# Patient Record
Sex: FEMALE | Race: ASIAN | NOT HISPANIC OR LATINO
[De-identification: names, ages, dates, MRNs, and addresses within clinical notes are randomized per-mention and may not be internally consistent; named-entity substitution may affect disease eponyms.]

---

## 2021-01-14 ENCOUNTER — APPOINTMENT (OUTPATIENT)
Dept: OBGYN | Facility: CLINIC | Age: 36
End: 2021-01-14
Payer: COMMERCIAL

## 2021-01-14 PROCEDURE — 99202 OFFICE O/P NEW SF 15 MIN: CPT

## 2021-01-14 PROCEDURE — 76801 OB US < 14 WKS SINGLE FETUS: CPT

## 2021-01-14 PROCEDURE — 76813 OB US NUCHAL MEAS 1 GEST: CPT

## 2021-01-27 PROBLEM — Z00.00 ENCOUNTER FOR PREVENTIVE HEALTH EXAMINATION: Status: ACTIVE | Noted: 2021-01-27

## 2021-02-19 ENCOUNTER — INPATIENT (INPATIENT)
Facility: HOSPITAL | Age: 36
LOS: 2 days | Discharge: HOME | End: 2021-02-22
Attending: HOSPITALIST | Admitting: HOSPITALIST
Payer: COMMERCIAL

## 2021-02-19 VITALS
SYSTOLIC BLOOD PRESSURE: 106 MMHG | DIASTOLIC BLOOD PRESSURE: 57 MMHG | HEART RATE: 56 BPM | RESPIRATION RATE: 18 BRPM | OXYGEN SATURATION: 97 % | TEMPERATURE: 98 F

## 2021-02-19 DIAGNOSIS — U07.1 COVID-19: ICD-10-CM

## 2021-02-19 DIAGNOSIS — Z34.90 ENCOUNTER FOR SUPERVISION OF NORMAL PREGNANCY, UNSPECIFIED, UNSPECIFIED TRIMESTER: ICD-10-CM

## 2021-02-19 DIAGNOSIS — E03.9 HYPOTHYROIDISM, UNSPECIFIED: ICD-10-CM

## 2021-02-19 DIAGNOSIS — Z29.9 ENCOUNTER FOR PROPHYLACTIC MEASURES, UNSPECIFIED: ICD-10-CM

## 2021-02-19 DIAGNOSIS — R74.01 ELEVATION OF LEVELS OF LIVER TRANSAMINASE LEVELS: ICD-10-CM

## 2021-02-19 DIAGNOSIS — E87.1 HYPO-OSMOLALITY AND HYPONATREMIA: ICD-10-CM

## 2021-02-19 DIAGNOSIS — M54.9 DORSALGIA, UNSPECIFIED: ICD-10-CM

## 2021-02-19 LAB
ALBUMIN SERPL ELPH-MCNC: 3.3 G/DL — SIGNIFICANT CHANGE UP (ref 3.3–5)
ALP SERPL-CCNC: 67 U/L — SIGNIFICANT CHANGE UP (ref 40–120)
ALT FLD-CCNC: 45 U/L — HIGH (ref 4–33)
ANION GAP SERPL CALC-SCNC: 11 MMOL/L — SIGNIFICANT CHANGE UP (ref 7–14)
ANION GAP SERPL CALC-SCNC: 11 MMOL/L — SIGNIFICANT CHANGE UP (ref 7–14)
ANISOCYTOSIS BLD QL: SLIGHT — SIGNIFICANT CHANGE UP
APPEARANCE UR: CLEAR — SIGNIFICANT CHANGE UP
AST SERPL-CCNC: 83 U/L — HIGH (ref 4–32)
BASOPHILS # BLD AUTO: 0 K/UL — SIGNIFICANT CHANGE UP (ref 0–0.2)
BASOPHILS NFR BLD AUTO: 0 % — SIGNIFICANT CHANGE UP (ref 0–2)
BILIRUB SERPL-MCNC: 0.3 MG/DL — SIGNIFICANT CHANGE UP (ref 0.2–1.2)
BILIRUB UR-MCNC: NEGATIVE — SIGNIFICANT CHANGE UP
BUN SERPL-MCNC: 6 MG/DL — LOW (ref 7–23)
BUN SERPL-MCNC: 8 MG/DL — SIGNIFICANT CHANGE UP (ref 7–23)
CALCIUM SERPL-MCNC: 8.1 MG/DL — LOW (ref 8.4–10.5)
CALCIUM SERPL-MCNC: 8.6 MG/DL — SIGNIFICANT CHANGE UP (ref 8.4–10.5)
CHLORIDE SERPL-SCNC: 100 MMOL/L — SIGNIFICANT CHANGE UP (ref 98–107)
CHLORIDE SERPL-SCNC: 100 MMOL/L — SIGNIFICANT CHANGE UP (ref 98–107)
CO2 SERPL-SCNC: 20 MMOL/L — LOW (ref 22–31)
CO2 SERPL-SCNC: 22 MMOL/L — SIGNIFICANT CHANGE UP (ref 22–31)
COLOR SPEC: YELLOW — SIGNIFICANT CHANGE UP
CREAT SERPL-MCNC: 0.55 MG/DL — SIGNIFICANT CHANGE UP (ref 0.5–1.3)
CREAT SERPL-MCNC: 0.69 MG/DL — SIGNIFICANT CHANGE UP (ref 0.5–1.3)
DIFF PNL FLD: NEGATIVE — SIGNIFICANT CHANGE UP
EOSINOPHIL # BLD AUTO: 0 K/UL — SIGNIFICANT CHANGE UP (ref 0–0.5)
EOSINOPHIL NFR BLD AUTO: 0 % — SIGNIFICANT CHANGE UP (ref 0–6)
GLUCOSE SERPL-MCNC: 79 MG/DL — SIGNIFICANT CHANGE UP (ref 70–99)
GLUCOSE SERPL-MCNC: 80 MG/DL — SIGNIFICANT CHANGE UP (ref 70–99)
GLUCOSE UR QL: NEGATIVE — SIGNIFICANT CHANGE UP
HCT VFR BLD CALC: 36.9 % — SIGNIFICANT CHANGE UP (ref 34.5–45)
HCT VFR BLD CALC: 41.2 % — SIGNIFICANT CHANGE UP (ref 34.5–45)
HGB BLD-MCNC: 12.2 G/DL — SIGNIFICANT CHANGE UP (ref 11.5–15.5)
HGB BLD-MCNC: 13.1 G/DL — SIGNIFICANT CHANGE UP (ref 11.5–15.5)
IANC: 5.82 K/UL — SIGNIFICANT CHANGE UP (ref 1.5–8.5)
KETONES UR-MCNC: ABNORMAL
LEUKOCYTE ESTERASE UR-ACNC: NEGATIVE — SIGNIFICANT CHANGE UP
LYMPHOCYTES # BLD AUTO: 0.5 K/UL — LOW (ref 1–3.3)
LYMPHOCYTES # BLD AUTO: 6.3 % — LOW (ref 13–44)
MCHC RBC-ENTMCNC: 27.2 PG — SIGNIFICANT CHANGE UP (ref 27–34)
MCHC RBC-ENTMCNC: 27.4 PG — SIGNIFICANT CHANGE UP (ref 27–34)
MCHC RBC-ENTMCNC: 31.8 GM/DL — LOW (ref 32–36)
MCHC RBC-ENTMCNC: 33.1 GM/DL — SIGNIFICANT CHANGE UP (ref 32–36)
MCV RBC AUTO: 82.7 FL — SIGNIFICANT CHANGE UP (ref 80–100)
MCV RBC AUTO: 85.5 FL — SIGNIFICANT CHANGE UP (ref 80–100)
METAMYELOCYTES # FLD: 2.7 % — HIGH (ref 0–1)
MICROCYTES BLD QL: SLIGHT — SIGNIFICANT CHANGE UP
MONOCYTES # BLD AUTO: 0.71 K/UL — SIGNIFICANT CHANGE UP (ref 0–0.9)
MONOCYTES NFR BLD AUTO: 9 % — SIGNIFICANT CHANGE UP (ref 2–14)
NEUTROPHILS # BLD AUTO: 6.03 K/UL — SIGNIFICANT CHANGE UP (ref 1.8–7.4)
NEUTROPHILS NFR BLD AUTO: 55.9 % — SIGNIFICANT CHANGE UP (ref 43–77)
NEUTS BAND # BLD: 20.7 % — CRITICAL HIGH (ref 0–6)
NITRITE UR-MCNC: NEGATIVE — SIGNIFICANT CHANGE UP
NRBC # BLD: 0 /100 WBCS — SIGNIFICANT CHANGE UP
NRBC # FLD: 0 K/UL — SIGNIFICANT CHANGE UP
OVALOCYTES BLD QL SMEAR: SLIGHT — SIGNIFICANT CHANGE UP
PH UR: 6.5 — SIGNIFICANT CHANGE UP (ref 5–8)
PLAT MORPH BLD: ABNORMAL
PLATELET # BLD AUTO: 175 K/UL — SIGNIFICANT CHANGE UP (ref 150–400)
PLATELET # BLD AUTO: 192 K/UL — SIGNIFICANT CHANGE UP (ref 150–400)
PLATELET COUNT - ESTIMATE: NORMAL — SIGNIFICANT CHANGE UP
POIKILOCYTOSIS BLD QL AUTO: SLIGHT — SIGNIFICANT CHANGE UP
POTASSIUM SERPL-MCNC: 3.9 MMOL/L — SIGNIFICANT CHANGE UP (ref 3.5–5.3)
POTASSIUM SERPL-MCNC: 4.3 MMOL/L — SIGNIFICANT CHANGE UP (ref 3.5–5.3)
POTASSIUM SERPL-SCNC: 3.9 MMOL/L — SIGNIFICANT CHANGE UP (ref 3.5–5.3)
POTASSIUM SERPL-SCNC: 4.3 MMOL/L — SIGNIFICANT CHANGE UP (ref 3.5–5.3)
PROT SERPL-MCNC: 6.8 G/DL — SIGNIFICANT CHANGE UP (ref 6–8.3)
PROT UR-MCNC: ABNORMAL
RBC # BLD: 4.46 M/UL — SIGNIFICANT CHANGE UP (ref 3.8–5.2)
RBC # BLD: 4.82 M/UL — SIGNIFICANT CHANGE UP (ref 3.8–5.2)
RBC # FLD: 13.7 % — SIGNIFICANT CHANGE UP (ref 10.3–14.5)
RBC # FLD: 13.7 % — SIGNIFICANT CHANGE UP (ref 10.3–14.5)
RBC BLD AUTO: ABNORMAL
SARS-COV-2 RNA SPEC QL NAA+PROBE: DETECTED
SMUDGE CELLS # BLD: PRESENT — SIGNIFICANT CHANGE UP
SODIUM SERPL-SCNC: 131 MMOL/L — LOW (ref 135–145)
SODIUM SERPL-SCNC: 133 MMOL/L — LOW (ref 135–145)
SP GR SPEC: 1.01 — SIGNIFICANT CHANGE UP (ref 1.01–1.02)
UROBILINOGEN FLD QL: SIGNIFICANT CHANGE UP
VARIANT LYMPHS # BLD: 5.4 % — SIGNIFICANT CHANGE UP (ref 0–6)
WBC # BLD: 7.87 K/UL — SIGNIFICANT CHANGE UP (ref 3.8–10.5)
WBC # BLD: 8.03 K/UL — SIGNIFICANT CHANGE UP (ref 3.8–10.5)
WBC # FLD AUTO: 7.87 K/UL — SIGNIFICANT CHANGE UP (ref 3.8–10.5)
WBC # FLD AUTO: 8.03 K/UL — SIGNIFICANT CHANGE UP (ref 3.8–10.5)

## 2021-02-19 PROCEDURE — 99223 1ST HOSP IP/OBS HIGH 75: CPT | Mod: AI

## 2021-02-19 PROCEDURE — 99285 EMERGENCY DEPT VISIT HI MDM: CPT

## 2021-02-19 PROCEDURE — 93970 EXTREMITY STUDY: CPT | Mod: 26

## 2021-02-19 RX ORDER — LIDOCAINE 4 G/100G
1 CREAM TOPICAL ONCE
Refills: 0 | Status: COMPLETED | OUTPATIENT
Start: 2021-02-19 | End: 2021-02-19

## 2021-02-19 RX ORDER — ENOXAPARIN SODIUM 100 MG/ML
40 INJECTION SUBCUTANEOUS EVERY 12 HOURS
Refills: 0 | Status: DISCONTINUED | OUTPATIENT
Start: 2021-02-19 | End: 2021-02-22

## 2021-02-19 RX ORDER — ACETAMINOPHEN 500 MG
650 TABLET ORAL EVERY 8 HOURS
Refills: 0 | Status: DISCONTINUED | OUTPATIENT
Start: 2021-02-19 | End: 2021-02-22

## 2021-02-19 RX ORDER — LEVOTHYROXINE SODIUM 125 MCG
125 TABLET ORAL DAILY
Refills: 0 | Status: DISCONTINUED | OUTPATIENT
Start: 2021-02-19 | End: 2021-02-22

## 2021-02-19 RX ORDER — SODIUM CHLORIDE 9 MG/ML
1000 INJECTION, SOLUTION INTRAVENOUS ONCE
Refills: 0 | Status: COMPLETED | OUTPATIENT
Start: 2021-02-19 | End: 2021-02-19

## 2021-02-19 RX ORDER — ONDANSETRON 8 MG/1
4 TABLET, FILM COATED ORAL EVERY 8 HOURS
Refills: 0 | Status: DISCONTINUED | OUTPATIENT
Start: 2021-02-19 | End: 2021-02-22

## 2021-02-19 RX ORDER — ENOXAPARIN SODIUM 100 MG/ML
40 INJECTION SUBCUTANEOUS DAILY
Refills: 0 | Status: DISCONTINUED | OUTPATIENT
Start: 2021-02-19 | End: 2021-02-19

## 2021-02-19 RX ORDER — VANCOMYCIN HCL 1 G
1000 VIAL (EA) INTRAVENOUS EVERY 12 HOURS
Refills: 0 | Status: DISCONTINUED | OUTPATIENT
Start: 2021-02-19 | End: 2021-02-20

## 2021-02-19 RX ORDER — ACETAMINOPHEN 500 MG
650 TABLET ORAL ONCE
Refills: 0 | Status: COMPLETED | OUTPATIENT
Start: 2021-02-19 | End: 2021-02-19

## 2021-02-19 RX ORDER — SODIUM CHLORIDE 9 MG/ML
1000 INJECTION, SOLUTION INTRAVENOUS
Refills: 0 | Status: DISCONTINUED | OUTPATIENT
Start: 2021-02-19 | End: 2021-02-22

## 2021-02-19 RX ORDER — ASPIRIN/CALCIUM CARB/MAGNESIUM 324 MG
81 TABLET ORAL DAILY
Refills: 0 | Status: DISCONTINUED | OUTPATIENT
Start: 2021-02-19 | End: 2021-02-22

## 2021-02-19 RX ORDER — ONDANSETRON 8 MG/1
4 TABLET, FILM COATED ORAL ONCE
Refills: 0 | Status: COMPLETED | OUTPATIENT
Start: 2021-02-19 | End: 2021-02-19

## 2021-02-19 RX ADMIN — LIDOCAINE 1 PATCH: 4 CREAM TOPICAL at 12:26

## 2021-02-19 RX ADMIN — Medication 650 MILLIGRAM(S): at 22:00

## 2021-02-19 RX ADMIN — LIDOCAINE 1 PATCH: 4 CREAM TOPICAL at 21:38

## 2021-02-19 RX ADMIN — SODIUM CHLORIDE 1000 MILLILITER(S): 9 INJECTION, SOLUTION INTRAVENOUS at 18:47

## 2021-02-19 RX ADMIN — Medication 250 MILLIGRAM(S): at 23:00

## 2021-02-19 RX ADMIN — SODIUM CHLORIDE 100 MILLILITER(S): 9 INJECTION, SOLUTION INTRAVENOUS at 18:47

## 2021-02-19 RX ADMIN — ONDANSETRON 4 MILLIGRAM(S): 8 TABLET, FILM COATED ORAL at 12:27

## 2021-02-19 RX ADMIN — SODIUM CHLORIDE 1000 MILLILITER(S): 9 INJECTION, SOLUTION INTRAVENOUS at 14:24

## 2021-02-19 RX ADMIN — Medication 650 MILLIGRAM(S): at 12:26

## 2021-02-19 RX ADMIN — Medication 81 MILLIGRAM(S): at 22:11

## 2021-02-19 RX ADMIN — SODIUM CHLORIDE 1000 MILLILITER(S): 9 INJECTION, SOLUTION INTRAVENOUS at 12:26

## 2021-02-19 RX ADMIN — Medication 1 TABLET(S): at 22:11

## 2021-02-19 NOTE — H&P ADULT - PROBLEM SELECTOR PLAN 6
Pt reports she takes LDN Naltrexone 4.5mg daily as per her OBG recs.  Will obtain OBG consult to clarify medication Pt reports she takes LDN Naltrexone 4.5mg daily as per her OBG recs which she has not taken for past 2-3 days due to vomiting   Called OBG  service  to clarify medication- await call back consult

## 2021-02-19 NOTE — ED ADULT NURSE NOTE - OBJECTIVE STATEMENT
Pt received to  6 c/o covid symptoms x 1 week. AxOx4 and ambulatory at baseline. Pt tested covid+ on Sunday. Pt endorses fever, cough, decreased PO intake, R lower back pain and b/l calf pain. Pt intermittently coughing at this time, in NAD, respirations even/unlabored, denies SOB, O2 sat 98% on RA. No redness/swelling noted to calves. Pt endorses vomiting x 2 and diarrhea x4 today, denies blood in stool or emesis. Pt denies headache, dizziness and chest pain at this time. Pt is ~16 weeks pregnant at this time with 1st pregnancy, had US in January. PMH of hyperthyroidism. 20G IV placed to L AC, labs drawn and sent, covid swabbed, medicated as per orders, will continue to monitor.

## 2021-02-19 NOTE — ED PROVIDER NOTE - PHYSICAL EXAMINATION
GEN: NAD, awake, well appearing  HEENT: NCAT, MMM, normal conjunctiva, perrl  CHEST/LUNGS: Non-tachypneic, CTAB, bilateral breath sounds  CARDIAC: Non-tachycardic, s1s2, normal perfusion, no peripheral edema  ABDOMEN: Soft, gravid abdomen, NTND, No rebound/guarding  MSK: No joint tenderness, no gross deformity of extremities  SKIN: No rashes, no petechiae, no vesicles  NEURO: CN grossly intact, normal coordination, no focal motor or sensory deficits  PSYCH: Alert, appropriate, cooperative

## 2021-02-19 NOTE — CONSULT NOTE ADULT - SUBJECTIVE AND OBJECTIVE BOX
FRANCY HOWARD  35y  Female [id number]    HPI:  35F 16 weeks pregnancy  was diagnosed with  COVID  on  presents to the ED with fever chills. Symptoms started on   Fever 100-101.2, relief with Tylenol extra strength. Associated chills. Decreased Fever since yday  Nausea/Vomiting Diarrhea since 2-3 days, 3-4 loose stools /days. No diarrhea since this AM  Was not able to tolerate much PO since 2-3 days. Was able to eat some crackers this AM   c/o Calf discomfort for past few days as well, was instructed by her obgyn to take baby ASA 81mg daily.   C/o Low back pain with stiffness- started last night, was given Lidocaine patch in the ED with some relief  No urinary or bowel complaints. No sensory or motor deficits  pts  and father have COVID- Father is admitted to the hospital for hypoxemia  Denies CP, SOB, dysuria, headache, numbness  In the ED, vitals were 97.7, 96,  111/59, 16 and 98%- 100%  on RA. Pt was given 2 L IVF.    Reports some improvement in overall condition    (2021 17:45)        Name of GYN Physician:     POB:      Pgyn: Denies fibroids, cysts, endometriosis, STI's, Abnormal pap smears     Home meds:     Hospital Meds:   MEDICATIONS  (STANDING):  enoxaparin Injectable 40 milliGRAM(s) SubCutaneous daily  lactated ringers. 1000 milliLiter(s) (100 mL/Hr) IV Continuous <Continuous>  levothyroxine 125 MICROGram(s) Oral daily  prenatal multivitamin 1 Tablet(s) Oral daily  vancomycin  IVPB 1000 milliGRAM(s) IV Intermittent every 12 hours    MEDICATIONS  (PRN):  acetaminophen   Tablet .. 650 milliGRAM(s) Oral every 8 hours PRN Temp greater or equal to 38C (100.4F), Mild Pain (1 - 3), Moderate Pain (4 - 6), Severe Pain (7 - 10)  ondansetron Injectable 4 milliGRAM(s) IV Push every 8 hours PRN Nausea and/or Vomiting      Allergies    No Known Allergies    Intolerances        PAST MEDICAL & SURGICAL HISTORY:  Hypothyroidism        FAMILY HISTORY:      Social History:  Denies smoking use, drug use, alcohol use.   +occasional social alcohol use    Vital Signs Last 24 Hrs  T(C): 36.9 (2021 16:50), Max: 36.9 (2021 16:50)  T(F): 98.5 (2021 16:50), Max: 98.5 (2021 16:50)  HR: 99 (2021 16:50) (56 - 99)  BP: 107/65 (2021 16:50) (106/57 - 111/59)  BP(mean): --  RR: 18 (2021 16:50) (16 - 18)  SpO2: 100% (2021 16:50) (97% - 100%)    Physical Exam:   General: sitting comftorably in bed, NAD   HEENT: neck supple, full ROM  CV: RR S1S2 no m/r/g  Lungs: CTA b/l, good air flow b/l   Back: No CVA tenderness  Abd: Soft, non-tender, non-distended.  Bowel sounds present.    :  No bleeding on pad.    External labia wnl.  Bimanual exam with no cervical motion tenderness, uterus wnl, adnexa non palpable b/l.  Cervix closed vs. Cervix dilated    cm   Speculum Exam: No active bleeding from os.  Physiologic discharge.    Ext: non-tender b/l, no edema     LABS:                              13.1   7.87  )-----------( 192      ( 2021 12:52 )             41.2         133<L>  |  100  |  8   ----------------------------<  80  4.3   |  22  |  0.69    Ca    8.6      2021 12:52    TPro  6.8  /  Alb  3.3  /  TBili  0.3  /  DBili  x   /  AST  83<H>  /  ALT  45<H>  /  AlkPhos  67      I&O's Detail      Urinalysis Basic - ( 2021 14:31 )    Color: Yellow / Appearance: Clear / S.013 / pH: x  Gluc: x / Ketone: Small  / Bili: Negative / Urobili: <2 mg/dL   Blood: x / Protein: Trace / Nitrite: Negative   Leuk Esterase: Negative / RBC: x / WBC x   Sq Epi: x / Non Sq Epi: x / Bacteria: x        RADIOLOGY & ADDITIONAL STUDIES: FRANCY HOWARD  35y  Female [id number]    HPI:  36yo  at 16+3 (by EDC 8/3/21 per pt, no sonograms or outpt notes available) admitted with COVID. Pt diagnosed , with fevers, chills, nausea/vomiting, decreased PO intake and body aches. Pt denies pelvic cramping or pain, no discharge, no vaginal bleeding.     FHR documented in     This is an IVF pregnancy. Pt started on Naltrexone by her [facility] doctors ([facility]) prior to implantation. Pt unsure of reason for medication but reports she was told to continue it throughout pregnancy.   Pt seen by MFM for consultation in this pregnancy, due to AMA and IVF. Pt currently on baby ASA QD as well.     Name of GYN Physician: Dr. Dodd  POB:  P0  Pgyn: Infertility. Denies fibroids, cysts, endometriosis, STI's, Abnormal pap smears   Home meds: ASA, Naltrexone, PNV, Synthroid  Allergies: No Known Allergies    PAST MEDICAL & SURGICAL HISTORY:  Hypothyroidism      Vital Signs Last 24 Hrs  T(C): 36.9 (2021 16:50), Max: 36.9 (2021 16:50)  T(F): 98.5 (2021 16:50), Max: 98.5 (2021 16:50)  HR: 99 (2021 16:50) (56 - 99)  BP: 107/65 (2021 16:50) (106/57 - 111/59)  BP(mean): --  RR: 18 (2021 16:50) (16 - 18)  SpO2: 100% (2021 16:50) (97% - 100%)    Physical Exam:   General: sitting comftorably in bed, NAD  Lungs: Coughing, unlabored breathing at rest, on RA  Abd: Soft, non-tender, non-distended.  Gravid.  Ext: non-tender b/l, no edema     LABS:                              13.1   7.87  )-----------( 192      ( 2021 12:52 )             41.2     02-19    133<L>  |  100  |  8   ----------------------------<  80  4.3   |  22  |  0.69    Ca    8.6      2021 12:52    TPro  6.8  /  Alb  3.3  /  TBili  0.3  /  DBili  x   /  AST  83<H>  /  ALT  45<H>  /  AlkPhos  67      I&O's Detail      Urinalysis Basic - ( 2021 14:31 )    Color: Yellow / Appearance: Clear / S.013 / pH: x  Gluc: x / Ketone: Small  / Bili: Negative / Urobili: <2 mg/dL   Blood: x / Protein: Trace / Nitrite: Negative   Leuk Esterase: Negative / RBC: x / WBC x   Sq Epi: x / Non Sq Epi: x / Bacteria: x        RADIOLOGY & ADDITIONAL STUDIES:

## 2021-02-19 NOTE — CONSULT NOTE ADULT - ASSESSMENT
36yo  at 16+3 admitted with COVID, febrile with bandemia, not O2 requiring, with fetal status assured in ED. Ob consulted for medical recommendations   - Pregnancy currently nonviable, recommend treating patient with all routine COVID treatments and medications. Avoid NSAIDS or Teratogenic ABx, however no additional considerations needed at current gestational age.   - Continue ASA as prescribed as patient is AMA with IVF pregnancy, high risk for hypertensive disorders of pregnancy   - Continue PNV and home synthroid dose   - Regarding current naltrexone use - some data exists regarding the utility of Low Dose Naltrexone in the setting of infertility and TAMY treatments, in an "off label use" - no known recommendations regarding the continuation of medication throughout pregnancy and into second/third trimester. No known harm of medication in pregnancy and safe to continue. However, will consult MFM/High Risk Ob team in morning regarding recommendations for continuation vs discontinuation of this regimen at current gestational age.   - Recommend obtaining daily FH check to assure fetal status.   - Pt to be signed out and added to Antepartum List. Please call [phone] with questions.     cyril Vang, covering attending for Dr. Dodd  Signed out to Tenisha PGY3  Trav PGY2 36yo  at 16+3 admitted with COVID, febrile with bandemia, not O2 requiring, with fetal status assured in ED. Ob consulted for medical recommendations   - Pregnancy currently nonviable, recommend treating patient with all routine COVID treatments and medications. Avoid NSAIDS or Teratogenic ABx, however no additional considerations needed at current gestational age.   - Continue ASA as prescribed as patient is AMA with IVF pregnancy, high risk for hypertensive disorders of pregnancy   - Continue PNV and home synthroid dose   - Regarding current naltrexone use - prescribed by outside [facility]. Will consult MFM/High Risk Ob team in morning regarding recommendations for continuation vs discontinuation of this regimen at current gestational age.   - Recommend obtaining daily FH check to assure fetal status.   - Pt to be signed out and added to Antepartum List. Please call [phone] with questions.     cyril Vang, covering attending for Dr. Dodd  Signed out to Tenisha PGY3  Trav PGY2

## 2021-02-19 NOTE — H&P ADULT - HISTORY OF PRESENT ILLNESS
35F 16 weeks pregnancy  was diagnosed with  COVID  on  presents to the ED with fever chills. Symptoms started on   Fever 100-101.2, relief with Tylenol extra strength. Associated chills. Decreased Fever since yday  Nausea/Vomiting Diarrhea since 2-3 days, 3-4 loose stools /days. No diarrhea since this AM  Was not able to tolerate much PO since 2-3 days. Was able to eat some crackers this AM   c/o Calf discomfort for past few days as well, was instructed by her obgyn to take baby ASA 81mg daily.   C/o Low back pain with stiffness- started last night, was given Lidocaine patch in the ED with some relief  No urinary or bowel complaints. No sensory or motor deficits  pts  and father have COVID- Father is admitted to the hospital for hypoxemia  Denies CP, SOB, dysuria, headache, numbness  In the ED, vitals were 97.7, 96,  111/59, 16 and 98%- 100%  on RA. Pt was given 2 L IVF.    Reports some improvement in overall condition

## 2021-02-19 NOTE — H&P ADULT - PROBLEM SELECTOR PLAN 3
Likely Musculoskeletal  No neurological or bowel/bladder deficits  Some relief  with Lidocaine  If no further improvement, will consider imaging

## 2021-02-19 NOTE — ED ADULT NURSE NOTE - NS ED NURSE RECORD ANOTHER HT AND WT
"Chief Complaint   Patient presents with     Urgent Care     Pt in clinic to have eval for facial rash.  Pt states she has hx of eczema.     Derm Problem       Initial /74  Pulse 89  Temp 96.7  F (35.9  C) (Tympanic)  Wt 146 lb (66.2 kg)  LMP 07/31/2017  SpO2 100%  BMI 23.57 kg/m2 Estimated body mass index is 23.57 kg/(m^2) as calculated from the following:    Height as of 8/31/13: 5' 6\" (1.676 m).    Weight as of this encounter: 146 lb (66.2 kg).  Medication Reconciliation: complete   Trina Escobar/ MA    "
No

## 2021-02-19 NOTE — H&P ADULT - PROBLEM SELECTOR PLAN 1
presenting with fever, chills, vomiting  and diarrhea  clinically improving- Afebrile, no further vomiting or diarrhea since this AM  Saturating 100% on RA  Labs significant  for Bandemia of 20%  DW ID fellow Dr Oseguera- Advise to start Vancomycin,  and FU infectious work up   Fu blood cultures, urine cultures, CRP, LDH, D dimer  Monitor Diarrhea- as per pt, no further episode since this AM.  If continues to have diarrhea with ongoing bandemia- consider stool for  C diff   Fu ID consult recs presenting with fever, chills, vomiting  and diarrhea  clinically improving- Afebrile, no further vomiting or diarrhea since this AM  Saturating 100% on RA  Labs significant  for Bandemia of 20%  DW ID fellow Dr Oseguera- Advise to start Vancomycin,  and FU infectious work up   Discussed with pharmacy- Vancomycin safe to use in pregnancy  Discussed plan with pt  Fu blood cultures, urine cultures, CRP, LDH, D dimer  Monitor Diarrhea- as per pt, no further episode since this AM.  If continues to have diarrhea with ongoing bandemia- consider stool for  C diff   Fu ID consult recs

## 2021-02-19 NOTE — ED ADULT TRIAGE NOTE - CHIEF COMPLAINT QUOTE
GIANNA 8/1/21  Covid-19 +.  Presents with c/o fevers ranging from 100-101.  Vomiting and diarrhea, unable to keep food, with right sided bank, and bilateral calf pain.  Pt endorses via 16 weeks pregnant.

## 2021-02-19 NOTE — ED PROVIDER NOTE - CLINICAL SUMMARY MEDICAL DECISION MAKING FREE TEXT BOX
Patient presenting COVID+, 16 weeks pregnant with viral syndrome. Well appearing benign exam. Poor PO intake x 1 week concerning for dehydration, to hydrate and treat symptomatically. Otherwise with b/l calf discomfort in setting of COVID and pregnancy. Exam with no significant tenderness, however dvt included in differential though low suspicion given exam and timeline. To obtain screening duplex.

## 2021-02-19 NOTE — ED PROVIDER NOTE - OBJECTIVE STATEMENT
35F 16 weeks pregnancy , COVID + () symptom onset on  with fever, chills, myalgias, nausea, vomiting, diarrhea 35F 16 weeks pregnancy , COVID + () symptom onset on  with fever, chills, cough, myalgias, nausea, vomiting, diarrhea. States has been having trouble keeping anything down and feels progressively weakner. States has been having b/l calf discomfort for past few days as well and instructed by her obgyn to take baby ASA 81mg daily. Denies cp, sob, dysuria, headache, numbness

## 2021-02-20 LAB
ALBUMIN SERPL ELPH-MCNC: 2.9 G/DL — LOW (ref 3.3–5)
ALP SERPL-CCNC: 69 U/L — SIGNIFICANT CHANGE UP (ref 40–120)
ALT FLD-CCNC: 45 U/L — HIGH (ref 4–33)
ANION GAP SERPL CALC-SCNC: 12 MMOL/L — SIGNIFICANT CHANGE UP (ref 7–14)
AST SERPL-CCNC: 77 U/L — HIGH (ref 4–32)
BILIRUB SERPL-MCNC: 0.2 MG/DL — SIGNIFICANT CHANGE UP (ref 0.2–1.2)
BUN SERPL-MCNC: 5 MG/DL — LOW (ref 7–23)
CALCIUM SERPL-MCNC: 8 MG/DL — LOW (ref 8.4–10.5)
CHLORIDE SERPL-SCNC: 101 MMOL/L — SIGNIFICANT CHANGE UP (ref 98–107)
CO2 SERPL-SCNC: 20 MMOL/L — LOW (ref 22–31)
CREAT SERPL-MCNC: 0.61 MG/DL — SIGNIFICANT CHANGE UP (ref 0.5–1.3)
CRP SERPL-MCNC: 82.4 MG/L — HIGH
CULTURE RESULTS: SIGNIFICANT CHANGE UP
D DIMER BLD IA.RAPID-MCNC: 455 NG/ML DDU — HIGH
GLUCOSE SERPL-MCNC: 80 MG/DL — SIGNIFICANT CHANGE UP (ref 70–99)
HCT VFR BLD CALC: 37.4 % — SIGNIFICANT CHANGE UP (ref 34.5–45)
HGB BLD-MCNC: 12 G/DL — SIGNIFICANT CHANGE UP (ref 11.5–15.5)
LDH SERPL L TO P-CCNC: 276 U/L — HIGH (ref 135–225)
MCHC RBC-ENTMCNC: 27.2 PG — SIGNIFICANT CHANGE UP (ref 27–34)
MCHC RBC-ENTMCNC: 32.1 GM/DL — SIGNIFICANT CHANGE UP (ref 32–36)
MCV RBC AUTO: 84.8 FL — SIGNIFICANT CHANGE UP (ref 80–100)
NRBC # BLD: 0 /100 WBCS — SIGNIFICANT CHANGE UP
NRBC # FLD: 0 K/UL — SIGNIFICANT CHANGE UP
OB PNL STL: NEGATIVE — SIGNIFICANT CHANGE UP
PLATELET # BLD AUTO: 187 K/UL — SIGNIFICANT CHANGE UP (ref 150–400)
POTASSIUM SERPL-MCNC: 3.9 MMOL/L — SIGNIFICANT CHANGE UP (ref 3.5–5.3)
POTASSIUM SERPL-SCNC: 3.9 MMOL/L — SIGNIFICANT CHANGE UP (ref 3.5–5.3)
PROT SERPL-MCNC: 5.9 G/DL — LOW (ref 6–8.3)
RBC # BLD: 4.41 M/UL — SIGNIFICANT CHANGE UP (ref 3.8–5.2)
RBC # FLD: 13.6 % — SIGNIFICANT CHANGE UP (ref 10.3–14.5)
SODIUM SERPL-SCNC: 133 MMOL/L — LOW (ref 135–145)
SPECIMEN SOURCE: SIGNIFICANT CHANGE UP
WBC # BLD: 8.01 K/UL — SIGNIFICANT CHANGE UP (ref 3.8–10.5)
WBC # FLD AUTO: 8.01 K/UL — SIGNIFICANT CHANGE UP (ref 3.8–10.5)

## 2021-02-20 PROCEDURE — 99221 1ST HOSP IP/OBS SF/LOW 40: CPT

## 2021-02-20 PROCEDURE — 99223 1ST HOSP IP/OBS HIGH 75: CPT

## 2021-02-20 RX ORDER — INFLUENZA VIRUS VACCINE 15; 15; 15; 15 UG/.5ML; UG/.5ML; UG/.5ML; UG/.5ML
0.5 SUSPENSION INTRAMUSCULAR ONCE
Refills: 0 | Status: COMPLETED | OUTPATIENT
Start: 2021-02-20 | End: 2021-02-20

## 2021-02-20 RX ORDER — CEFTRIAXONE 500 MG/1
1000 INJECTION, POWDER, FOR SOLUTION INTRAMUSCULAR; INTRAVENOUS EVERY 24 HOURS
Refills: 0 | Status: DISCONTINUED | OUTPATIENT
Start: 2021-02-20 | End: 2021-02-21

## 2021-02-20 RX ADMIN — LIDOCAINE 1 PATCH: 4 CREAM TOPICAL at 00:15

## 2021-02-20 RX ADMIN — Medication 250 MILLIGRAM(S): at 13:08

## 2021-02-20 RX ADMIN — Medication 81 MILLIGRAM(S): at 13:08

## 2021-02-20 RX ADMIN — Medication 125 MICROGRAM(S): at 06:54

## 2021-02-20 RX ADMIN — ENOXAPARIN SODIUM 40 MILLIGRAM(S): 100 INJECTION SUBCUTANEOUS at 17:24

## 2021-02-20 RX ADMIN — Medication 1 TABLET(S): at 13:07

## 2021-02-20 RX ADMIN — ENOXAPARIN SODIUM 40 MILLIGRAM(S): 100 INJECTION SUBCUTANEOUS at 06:15

## 2021-02-20 NOTE — CONSULT NOTE ADULT - SUBJECTIVE AND OBJECTIVE BOX
The patient is a 35 year old female who presents with a chief complaint of fever. (2021 19:05)    HPI:  The patient is a 35 year old female with who at 36 wks gestation with past medical history of hypothyroidism  was diagnosed with COVID-19 on  who presents to the emergency department with fever and chills. Symptoms started on  with fever 100-101.2, relieved with Tylenol. She reports nausea, vomiting, and diarrhea for the last 2-3 days, with 3-4 loose stools with difficulty in tolerating PO intake. She reports being able to eat crackers this morning. She reports calf discomfort for the last few days and was instructed by her OBGYN to take ASA also associated with low back pain and stiffness. She denies any urinary symptoms and was treated with a lidocaine patch in the emergency department. Her  and father were diagnosed with COVID-19 and her father was admitted to the hospital for hypoxemia. CBC was unremarkable. CMP showed mild hyponatremia and decreased bicarbonate. Hypocalcemia was also noted. Bandemia of 20.7% was noted. Hypoproteinemia and hypoalbuminemia was also noted with transaminitis. CRP and LDH were elevated. D-dimer was 455. Urinalysis was negative. COVID-19 PCR was positive. DVT study was negative. She was started on intravenous vancomycin for bandemia. Blood cultures are in process. Infectious disease was consulted for COVID-19 recommendations.        prior hospital charts reviewed [x]  primary team notes reviewed [x]  other consultant notes reviewed [x]    PAST MEDICAL & SURGICAL HISTORY:  Hypothyroidism      Allergies  No Known Allergies    ANTIMICROBIALS (past 90 days)  MEDICATIONS  (STANDING):  vancomycin  IVPB   250 mL/Hr IV Intermittent (21 @ 23:00)        vancomycin  IVPB 1000 every 12 hours    OTHER MEDS: MEDICATIONS  (STANDING):  acetaminophen   Tablet .. 650 every 8 hours PRN  aspirin enteric coated 81 daily  enoxaparin Injectable 40 every 12 hours  levothyroxine 125 daily  ondansetron Injectable 4 every 8 hours PRN    SOCIAL HISTORY:  Denies smoking, alcohol, or recreational drug use     FAMILY HISTORY: Denies     REVIEW OF SYSTEMS  [  ] ROS unobtainable because:    [x] All other systems negative except as noted below:	    Constitutional:  [x] fever [x] chills  [ ] weight loss  [x] weakness  Skin:  [ ] rash [ ] phlebitis	  Eyes: [ ] icterus [ ] pain  [ ] discharge	  ENMT: [ ] sore throat  [ ] thrush [ ] ulcers [ ] exudates  Respiratory: [ ] dyspnea [ ] hemoptysis [ ] cough [ ] sputum	  Cardiovascular:  [ ] chest pain [ ] palpitations [ ] edema	  Gastrointestinal:  [x] nausea [x] vomiting [x] diarrhea [ ] constipation [ ] pain	  Genitourinary:  [ ] dysuria [ ] frequency [ ] hematuria [ ] discharge [ ] flank pain  [ ] incontinence  Musculoskeletal:  [ ] myalgias [ ] arthralgias [ ] arthritis  [ ] back pain  Neurological:  [ ] headache [ ] seizures  [ ] confusion/altered mental status  Psychiatric:  [ ] anxiety [ ] depression	  Hematology/Lymphatics:  [ ] lymphadenopathy  Endocrine:  [ ] adrenal [ ] thyroid  Allergic/Immunologic:	 [ ] transplant [ ] seasonal    Vital Signs Last 24 Hrs  T(F): 99.4 (21 @ 06:15), Max: 99.7 (21 @ 19:45)  Vital Signs Last 24 Hrs  HR: 102 (21 @ 06:15) (56 - 102)  BP: 102/62 (21 @ 06:15) (102/62 - 111/59)  RR: 18 (21 @ 06:15)  SpO2: 97% (21 @ 06:15) (97% - 100%)  Wt(kg): --    PHYSICAL EXAM:  Constitutional: non-toxic, no distress  HEAD/EYES: anicteric, no conjunctival injection  ENT:  supple, no thrush  Cardiovascular:   normal S1, S2, no murmur, no edema  Respiratory:  clear BS bilaterally, no wheezes, no rales  GI:  soft, non-tender, normal bowel sounds  :  no garcia, no CVA tenderness  Musculoskeletal:  no synovitis, normal ROM  Neurologic: awake and alert, normal strength, no focal findings  Skin:  no rash, no erythema, no phlebitis  Heme/Onc: no lymphadenopathy   Psychiatric:  awake, alert, appropriate mood                            12.0   8.01  )-----------( 187      ( 2021 06:49 )             37.4   02-    133<L>  |  101  |  5<L>  ----------------------------<  80  3.9   |  20<L>  |  0.61    Ca    8.0<L>      2021 06:49    TPro  5.9<L>  /  Alb  2.9<L>  /  TBili  0.2  /  DBili  x   /  AST  77<H>  /  ALT  45<H>  /  AlkPhos  69  02-20    Urinalysis Basic - ( 2021 14:31 )    Color: Yellow / Appearance: Clear / S.013 / pH: x  Gluc: x / Ketone: Small  / Bili: Negative / Urobili: <2 mg/dL   Blood: x / Protein: Trace / Nitrite: Negative   Leuk Esterase: Negative / RBC: x / WBC x   Sq Epi: x / Non Sq Epi: x / Bacteria: x    MICROBIOLOGY:    + COVID-19 PCR    Blood cultures in process x 2          RADIOLOGY:    < from: US Duplex Venous Lower Ext Complete, Bilateral (21 @ 13:42) >  FINDINGS:    There is normal compressibility of the bilateral common femoral, femoral and popliteal veins.  Doppler examination shows normal spontaneous and phasic flow.    Nocalf vein thrombosis is detected.    IMPRESSION:  No evidence of deep venous thrombosis in either lower extremity.      < end of copied text >   The patient is a 35 year old female who presents with a chief complaint of fever. (2021 19:05)    HPI:  The patient is a 35 year old female with who at 36 wks gestation with past medical history of hypothyroidism  was diagnosed with COVID-19 on  who presents to the emergency department with fever and chills. Symptoms started on  with fever 100-101.2, relieved with Tylenol. She reports nausea, vomiting, and diarrhea for the last 2-3 days, with 3-4 loose stools with difficulty in tolerating PO intake. She reports being able to eat crackers this morning. She reports calf discomfort for the last few days and was instructed by her OBGYN to take ASA also associated with low back pain and stiffness. She denies any urinary symptoms and was treated with a lidocaine patch in the emergency department. Her  and father were diagnosed with COVID-19 and her father was admitted to the hospital for hypoxemia. CBC was unremarkable. CMP showed mild hyponatremia and decreased bicarbonate. Hypocalcemia was also noted. Bandemia of 20.7% was noted. Hypoproteinemia and hypoalbuminemia was also noted with transaminitis. CRP and LDH were elevated. D-dimer was 455. Urinalysis was negative. COVID-19 PCR was positive. DVT study was negative. She was started on intravenous vancomycin for bandemia. Blood cultures are in process. Infectious disease was consulted for COVID-19 recommendations.        prior hospital charts reviewed [x]  primary team notes reviewed [x]  other consultant notes reviewed [x]    PAST MEDICAL & SURGICAL HISTORY:  Hypothyroidism      Allergies  No Known Allergies    ANTIMICROBIALS (past 90 days)  MEDICATIONS  (STANDING):  vancomycin  IVPB   250 mL/Hr IV Intermittent (21 @ 23:00)        vancomycin  IVPB 1000 every 12 hours    OTHER MEDS: MEDICATIONS  (STANDING):  acetaminophen   Tablet .. 650 every 8 hours PRN  aspirin enteric coated 81 daily  enoxaparin Injectable 40 every 12 hours  levothyroxine 125 daily  ondansetron Injectable 4 every 8 hours PRN    SOCIAL HISTORY:  Denies smoking, alcohol, or recreational drug use     FAMILY HISTORY: Denies     REVIEW OF SYSTEMS  [  ] ROS unobtainable because:    [x] All other systems negative except as noted below:	    Constitutional:  [x] fever [x] chills  [ ] weight loss  [x] weakness  Skin:  [ ] rash [ ] phlebitis	  Eyes: [ ] icterus [ ] pain  [ ] discharge	  ENMT: [ ] sore throat  [ ] thrush [ ] ulcers [ ] exudates  Respiratory: [x] dyspnea [ ] hemoptysis [x] cough [ ] sputum	  Cardiovascular:  [ ] chest pain [ ] palpitations [ ] edema	  Gastrointestinal:  [x] nausea [x] vomiting [x] diarrhea [ ] constipation [ ] pain	  Genitourinary:  [ ] dysuria [ ] frequency [ ] hematuria [ ] discharge [ ] flank pain  [ ] incontinence  Musculoskeletal:  [ ] myalgias [ ] arthralgias [ ] arthritis  [ ] back pain  Neurological:  [ ] headache [ ] seizures  [ ] confusion/altered mental status  Psychiatric:  [ ] anxiety [ ] depression	  Hematology/Lymphatics:  [ ] lymphadenopathy  Endocrine:  [ ] adrenal [ ] thyroid  Allergic/Immunologic:	 [ ] transplant [ ] seasonal    Vital Signs Last 24 Hrs  T(F): 99.4 (21 @ 06:15), Max: 99.7 (21 @ 19:45)  Vital Signs Last 24 Hrs  HR: 102 (21 @ 06:15) (56 - 102)  BP: 102/62 (21 @ 06:15) (102/62 - 111/59)  RR: 18 (21 @ 06:15)  SpO2: 97% (21 @ 06:15) (97% - 100%)  Wt(kg): --    PHYSICAL EXAM:  Constitutional: non-toxic, no distress  HEAD/EYES: anicteric, no conjunctival injection  ENT:  supple, no thrush  Cardiovascular:   normal S1, S2, no murmur, no edema  Respiratory:  clear BS bilaterally, no wheezes, no rales  GI:  soft, non-tender, normal bowel sounds  :  no garcia, no CVA tenderness  Musculoskeletal:  no synovitis, normal ROM  Neurologic: awake and alert, normal strength, no focal findings  Skin:  no rash, no erythema, no phlebitis  Heme/Onc: no lymphadenopathy   Psychiatric:  awake, alert, appropriate mood                            12.0   8.01  )-----------( 187      ( 2021 06:49 )             37.4   02-    133<L>  |  101  |  5<L>  ----------------------------<  80  3.9   |  20<L>  |  0.61    Ca    8.0<L>      2021 06:49    TPro  5.9<L>  /  Alb  2.9<L>  /  TBili  0.2  /  DBili  x   /  AST  77<H>  /  ALT  45<H>  /  AlkPhos  69  02-20    Urinalysis Basic - ( 2021 14:31 )    Color: Yellow / Appearance: Clear / S.013 / pH: x  Gluc: x / Ketone: Small  / Bili: Negative / Urobili: <2 mg/dL   Blood: x / Protein: Trace / Nitrite: Negative   Leuk Esterase: Negative / RBC: x / WBC x   Sq Epi: x / Non Sq Epi: x / Bacteria: x    MICROBIOLOGY:    + COVID-19 PCR    Blood cultures in process x 2          RADIOLOGY:    < from: US Duplex Venous Lower Ext Complete, Bilateral (21 @ 13:42) >  FINDINGS:    There is normal compressibility of the bilateral common femoral, femoral and popliteal veins.  Doppler examination shows normal spontaneous and phasic flow.    Nocalf vein thrombosis is detected.    IMPRESSION:  No evidence of deep venous thrombosis in either lower extremity.      < end of copied text >

## 2021-02-20 NOTE — CONSULT NOTE ADULT - ATTENDING COMMENTS
35 year old at 17 weeks gestation presents with fever, myalgia due to COVID.    1) COVID  Symptom onset on 2/12  COVID positive on 2/14    She is not hypoxic- no indication for remdesvir or steroids    2) Bandemia  ? if this is real    Repeat CBC with manual diff    Change to ceftriaxone 1 gram iv daily  If blood cultures are negative at 48 hours, stop ceftriaxone    3) Diarrhea  likely due to covid  Can check GI pcr/ c diff    4) Transaminitis  likely due to COVID    Supportive care

## 2021-02-20 NOTE — CONSULT NOTE ADULT - ASSESSMENT
Assessment:  The patient is a 35 year old female with who at 36 wks gestation with past medical history of hypothyroidism  was diagnosed with COVID-19 on  who presents to the emergency department with fever and chills. Symptoms started on  with fever 100-101.2, relieved with Tylenol. She reports nausea, vomiting, and diarrhea for the last 2-3 days, with 3-4 loose stools with difficulty in tolerating PO intake. She reports being able to eat crackers this morning. She reports calf discomfort for the last few days and was instructed by her OBGYN to take ASA also associated with low back pain and stiffness. She denies any urinary symptoms and was treated with a lidocaine patch in the emergency department. Her  and father were diagnosed with COVID-19 and her father was admitted to the hospital for hypoxemia. COVID-19 PCR was positive and she was admitted for worsening disease.      Plan:  # COVID-19 disease  - Trend CRP, LDH, D-dimer, and Ferritin q48 hours  - Obtain procalcitonin level  - Blood cultures in process   - Trend CBC and CMP daily   - Monitor fever curve  - Maintain airborne and contact precautions  - ID will continue to follow    Vinnie Oseguera MD PGY-4   Fellow, Infectious Diseases   Pager: [phone]  If no response, after 5pm and on weekends: Call [phone]   Assessment:  The patient is a 35 year old female with who at 36 wks gestation with past medical history of hypothyroidism  was diagnosed with COVID-19 on  who presents to the emergency department with fever and chills. Symptoms started on  with fever 100-101.2, relieved with Tylenol. She reports nausea, vomiting, and diarrhea for the last 2-3 days, with 3-4 loose stools with difficulty in tolerating PO intake. She reports being able to eat crackers this morning. She reports calf discomfort for the last few days and was instructed by her OBGYN to take ASA also associated with low back pain and stiffness. She denies any urinary symptoms and was treated with a lidocaine patch in the emergency department. Her  and father were diagnosed with COVID-19 and her father was admitted to the hospital for hypoxemia. COVID-19 PCR was positive and she was admitted for worsening disease.      Plan:  # COVID-19 disease  - Patient does not require intravenous dexamethasone and intravenous remdesevir at this time  - Trend CRP, LDH, D-dimer, and Ferritin q48 hours  - Switch intravenous vancomycin to intravenous ceftriaxone   - Send GI PCR and C. diff panel  - Obtain procalcitonin level  - Blood cultures in process   - Trend CBC and CMP daily   - Monitor fever curve  - Maintain airborne and contact precautions  - ID will continue to follow    Vinnie Oseguera MD PGY-4   Fellow, Infectious Diseases   Pager: [phone]  If no response, after 5pm and on weekends: Call [phone]

## 2021-02-20 NOTE — CHART NOTE - NSCHARTNOTEFT_GEN_A_CORE
RUBINM attg  Pt seen and counseled by me- asked to decide if LDN should be continued.  Pt says she was started on LDN by her fertility doctor and has continued to take it. She is unaware why it was prescribed- denies any h.o OUD and says she has missed doses without any symptoms. LDN has no known clinical use for pregnancy but has been used without adverse fetal/ effects. I would suggest it not be restarted.

## 2021-02-21 LAB
ALBUMIN SERPL ELPH-MCNC: 2.6 G/DL — LOW (ref 3.3–5)
ALP SERPL-CCNC: 80 U/L — SIGNIFICANT CHANGE UP (ref 40–120)
ALT FLD-CCNC: 44 U/L — HIGH (ref 4–33)
ANION GAP SERPL CALC-SCNC: 13 MMOL/L — SIGNIFICANT CHANGE UP (ref 7–14)
AST SERPL-CCNC: 68 U/L — HIGH (ref 4–32)
BILIRUB SERPL-MCNC: <0.2 MG/DL — SIGNIFICANT CHANGE UP (ref 0.2–1.2)
BUN SERPL-MCNC: 4 MG/DL — LOW (ref 7–23)
CALCIUM SERPL-MCNC: 8.6 MG/DL — SIGNIFICANT CHANGE UP (ref 8.4–10.5)
CHLORIDE SERPL-SCNC: 103 MMOL/L — SIGNIFICANT CHANGE UP (ref 98–107)
CO2 SERPL-SCNC: 22 MMOL/L — SIGNIFICANT CHANGE UP (ref 22–31)
CREAT SERPL-MCNC: 0.57 MG/DL — SIGNIFICANT CHANGE UP (ref 0.5–1.3)
GLUCOSE SERPL-MCNC: 81 MG/DL — SIGNIFICANT CHANGE UP (ref 70–99)
HCT VFR BLD CALC: 38 % — SIGNIFICANT CHANGE UP (ref 34.5–45)
HGB BLD-MCNC: 12.5 G/DL — SIGNIFICANT CHANGE UP (ref 11.5–15.5)
MAGNESIUM SERPL-MCNC: 1.8 MG/DL — SIGNIFICANT CHANGE UP (ref 1.6–2.6)
MCHC RBC-ENTMCNC: 27.4 PG — SIGNIFICANT CHANGE UP (ref 27–34)
MCHC RBC-ENTMCNC: 32.9 GM/DL — SIGNIFICANT CHANGE UP (ref 32–36)
MCV RBC AUTO: 83.3 FL — SIGNIFICANT CHANGE UP (ref 80–100)
NRBC # BLD: 0 /100 WBCS — SIGNIFICANT CHANGE UP
NRBC # FLD: 0 K/UL — SIGNIFICANT CHANGE UP
PHOSPHATE SERPL-MCNC: 3.1 MG/DL — SIGNIFICANT CHANGE UP (ref 2.5–4.5)
PLATELET # BLD AUTO: 215 K/UL — SIGNIFICANT CHANGE UP (ref 150–400)
POTASSIUM SERPL-MCNC: 3.9 MMOL/L — SIGNIFICANT CHANGE UP (ref 3.5–5.3)
POTASSIUM SERPL-SCNC: 3.9 MMOL/L — SIGNIFICANT CHANGE UP (ref 3.5–5.3)
PROT SERPL-MCNC: 6.1 G/DL — SIGNIFICANT CHANGE UP (ref 6–8.3)
RBC # BLD: 4.56 M/UL — SIGNIFICANT CHANGE UP (ref 3.8–5.2)
RBC # FLD: 13.6 % — SIGNIFICANT CHANGE UP (ref 10.3–14.5)
SODIUM SERPL-SCNC: 138 MMOL/L — SIGNIFICANT CHANGE UP (ref 135–145)
WBC # BLD: 7.4 K/UL — SIGNIFICANT CHANGE UP (ref 3.8–10.5)
WBC # FLD AUTO: 7.4 K/UL — SIGNIFICANT CHANGE UP (ref 3.8–10.5)

## 2021-02-21 PROCEDURE — 99232 SBSQ HOSP IP/OBS MODERATE 35: CPT

## 2021-02-21 RX ORDER — NALTREXONE HYDROCHLORIDE 50 MG/1
4.5 TABLET, FILM COATED ORAL
Qty: 0 | Refills: 0 | DISCHARGE

## 2021-02-21 RX ADMIN — Medication 125 MICROGRAM(S): at 05:20

## 2021-02-21 RX ADMIN — Medication 81 MILLIGRAM(S): at 12:15

## 2021-02-21 RX ADMIN — Medication 1 TABLET(S): at 12:15

## 2021-02-21 RX ADMIN — ENOXAPARIN SODIUM 40 MILLIGRAM(S): 100 INJECTION SUBCUTANEOUS at 17:08

## 2021-02-21 RX ADMIN — ENOXAPARIN SODIUM 40 MILLIGRAM(S): 100 INJECTION SUBCUTANEOUS at 05:20

## 2021-02-21 RX ADMIN — CEFTRIAXONE 100 MILLIGRAM(S): 500 INJECTION, POWDER, FOR SOLUTION INTRAMUSCULAR; INTRAVENOUS at 00:02

## 2021-02-21 RX ADMIN — Medication 650 MILLIGRAM(S): at 00:45

## 2021-02-21 NOTE — CHART NOTE - NSCHARTNOTEFT_GEN_A_CORE
Fetal heart rate check performed bedside with BSUS. +FM and +gross fetal movement.    Maureen Peterson, PGY3

## 2021-02-21 NOTE — PROGRESS NOTE ADULT - PROBLEM SELECTOR PLAN 3
Likely due to nausea/vomiting  Continue to monitor  FU repeat BMP
Likely Musculoskeletal  No neurological or bowel/bladder deficits  Some relief  with Lidocaine  If no further improvement, will consider imaging

## 2021-02-21 NOTE — PROGRESS NOTE ADULT - PROBLEM SELECTOR PLAN 6
Pt reports she takes LDN Naltrexone 4.5mg daily as per her OBG recs which she has not taken for past 2-3 days due to vomiting   Called OBG  service  to clarify medication- await call back consult
continue home med Levothyroxine

## 2021-02-21 NOTE — PROGRESS NOTE ADULT - PROBLEM SELECTOR PLAN 4
Likely secondary to COVID   Monitor
Likely Musculoskeletal  No neurological or bowel/bladder deficits  Some relief  with Lidocaine  If no further improvement, will consider imaging

## 2021-02-21 NOTE — PROGRESS NOTE ADULT - PROBLEM SELECTOR PLAN 2
Likely due to nausea/vomiting  Continue to monitor  FU repeat BMP
Fetal monitoring notes a stable heart rate--->+FM and +gross fetal movement.  Pt reports she takes LDN Naltrexone 4.5mg daily as per her OBG recs which she has not taken for past 2-3 days due to vomiting   Called OBG  service  to clarify medication- await call back consult

## 2021-02-22 ENCOUNTER — TRANSCRIPTION ENCOUNTER (OUTPATIENT)
Age: 36
End: 2021-02-22

## 2021-02-22 VITALS
SYSTOLIC BLOOD PRESSURE: 108 MMHG | HEART RATE: 91 BPM | RESPIRATION RATE: 15 BRPM | TEMPERATURE: 98 F | DIASTOLIC BLOOD PRESSURE: 70 MMHG | OXYGEN SATURATION: 97 %

## 2021-02-22 LAB
ALBUMIN SERPL ELPH-MCNC: 2.9 G/DL — LOW (ref 3.3–5)
ALP SERPL-CCNC: 90 U/L — SIGNIFICANT CHANGE UP (ref 40–120)
ALT FLD-CCNC: 49 U/L — HIGH (ref 4–33)
ANION GAP SERPL CALC-SCNC: 13 MMOL/L — SIGNIFICANT CHANGE UP (ref 7–14)
AST SERPL-CCNC: 69 U/L — HIGH (ref 4–32)
BILIRUB SERPL-MCNC: 0.2 MG/DL — SIGNIFICANT CHANGE UP (ref 0.2–1.2)
BUN SERPL-MCNC: 5 MG/DL — LOW (ref 7–23)
CALCIUM SERPL-MCNC: 8.7 MG/DL — SIGNIFICANT CHANGE UP (ref 8.4–10.5)
CHLORIDE SERPL-SCNC: 103 MMOL/L — SIGNIFICANT CHANGE UP (ref 98–107)
CO2 SERPL-SCNC: 21 MMOL/L — LOW (ref 22–31)
CREAT SERPL-MCNC: 0.59 MG/DL — SIGNIFICANT CHANGE UP (ref 0.5–1.3)
CULTURE RESULTS: SIGNIFICANT CHANGE UP
CULTURE RESULTS: SIGNIFICANT CHANGE UP
D DIMER BLD IA.RAPID-MCNC: 232 NG/ML DDU — HIGH
GLUCOSE SERPL-MCNC: 74 MG/DL — SIGNIFICANT CHANGE UP (ref 70–99)
HCT VFR BLD CALC: 38.7 % — SIGNIFICANT CHANGE UP (ref 34.5–45)
HGB BLD-MCNC: 12.6 G/DL — SIGNIFICANT CHANGE UP (ref 11.5–15.5)
MAGNESIUM SERPL-MCNC: 1.9 MG/DL — SIGNIFICANT CHANGE UP (ref 1.6–2.6)
MCHC RBC-ENTMCNC: 27.9 PG — SIGNIFICANT CHANGE UP (ref 27–34)
MCHC RBC-ENTMCNC: 32.6 GM/DL — SIGNIFICANT CHANGE UP (ref 32–36)
MCV RBC AUTO: 85.6 FL — SIGNIFICANT CHANGE UP (ref 80–100)
NRBC # BLD: 0 /100 WBCS — SIGNIFICANT CHANGE UP
NRBC # FLD: 0 K/UL — SIGNIFICANT CHANGE UP
PHOSPHATE SERPL-MCNC: 3.2 MG/DL — SIGNIFICANT CHANGE UP (ref 2.5–4.5)
PLATELET # BLD AUTO: 237 K/UL — SIGNIFICANT CHANGE UP (ref 150–400)
POTASSIUM SERPL-MCNC: 3.9 MMOL/L — SIGNIFICANT CHANGE UP (ref 3.5–5.3)
POTASSIUM SERPL-SCNC: 3.9 MMOL/L — SIGNIFICANT CHANGE UP (ref 3.5–5.3)
PROT SERPL-MCNC: 6.4 G/DL — SIGNIFICANT CHANGE UP (ref 6–8.3)
RBC # BLD: 4.52 M/UL — SIGNIFICANT CHANGE UP (ref 3.8–5.2)
RBC # FLD: 13.6 % — SIGNIFICANT CHANGE UP (ref 10.3–14.5)
SODIUM SERPL-SCNC: 137 MMOL/L — SIGNIFICANT CHANGE UP (ref 135–145)
SPECIMEN SOURCE: SIGNIFICANT CHANGE UP
SPECIMEN SOURCE: SIGNIFICANT CHANGE UP
WBC # BLD: 6.21 K/UL — SIGNIFICANT CHANGE UP (ref 3.8–10.5)
WBC # FLD AUTO: 6.21 K/UL — SIGNIFICANT CHANGE UP (ref 3.8–10.5)

## 2021-02-22 PROCEDURE — 99239 HOSP IP/OBS DSCHRG MGMT >30: CPT

## 2021-02-22 PROCEDURE — 99232 SBSQ HOSP IP/OBS MODERATE 35: CPT

## 2021-02-22 RX ORDER — SODIUM CHLORIDE 0.65 %
1 AEROSOL, SPRAY (ML) NASAL
Refills: 0 | Status: DISCONTINUED | OUTPATIENT
Start: 2021-02-22 | End: 2021-02-22

## 2021-02-22 RX ORDER — ACETAMINOPHEN 500 MG
2 TABLET ORAL
Qty: 0 | Refills: 0 | DISCHARGE
Start: 2021-02-22

## 2021-02-22 RX ORDER — SODIUM CHLORIDE 0.65 %
1 AEROSOL, SPRAY (ML) NASAL
Qty: 0 | Refills: 0 | DISCHARGE
Start: 2021-02-22

## 2021-02-22 RX ORDER — ASPIRIN/CALCIUM CARB/MAGNESIUM 324 MG
1 TABLET ORAL
Qty: 28 | Refills: 0
Start: 2021-02-22 | End: 2021-03-21

## 2021-02-22 RX ORDER — ASPIRIN/CALCIUM CARB/MAGNESIUM 324 MG
1 TABLET ORAL
Qty: 30 | Refills: 0
Start: 2021-02-22 | End: 2021-03-23

## 2021-02-22 RX ADMIN — Medication 81 MILLIGRAM(S): at 11:23

## 2021-02-22 RX ADMIN — Medication 125 MICROGRAM(S): at 05:49

## 2021-02-22 RX ADMIN — ENOXAPARIN SODIUM 40 MILLIGRAM(S): 100 INJECTION SUBCUTANEOUS at 05:49

## 2021-02-22 RX ADMIN — Medication 1 SPRAY(S): at 05:49

## 2021-02-22 RX ADMIN — Medication 1 TABLET(S): at 11:23

## 2021-02-22 RX ADMIN — Medication 650 MILLIGRAM(S): at 00:38

## 2021-02-22 NOTE — DISCHARGE NOTE PROVIDER - HOSPITAL COURSE
35F  16 weeks pregnancy  was diagnosed with  COVID  on  presents to the ED with fever chills.    Hospital course:    COVID-19.    -No need to start remedesivir or dexamethasone  -ID consulted: S/p vanco then dc'ed and managed with CTX --> then discontinued   -Diarrhea: stool culture prelim negative, GI PCR negative, C diff not sent as stool formed  -Blood cultures: negative to date   -LE duplex: negative     Pregnancy.   -Gyn consulted   -Fetal monitoring notes a stable heart rate--->+FM and +gross fetal movement.  -Pt reports she takes LDN Naltrexone 4.5mg daily as per her OBG recs which she has not taken for past 2-3 days due to vomiting     Hyponatremia.    -Likely due to nausea/vomiting - N/V improved prior to discharge  -Sodium normalized prior to discharge     Back pain.   -Likely Musculoskeletal  -No neurological or bowel/bladder deficits  -Improvement with Lidocaine patch     Transaminitis.    -Likely secondary to COVID   -Monitored     Hypothyroidism.   -continued home med Levothyroxine.    Need for prophylactic measure.    -Lovenox  -: Per discussion with medical attg Dr. Land, plan for discharge on ASA 81 mg x4 weeks (must be stopped when pt weeks 20 weeks gestation).     Pt medically stable for discharge on  as per discussion with Dr. Land.   Dispo: home      35F  16 weeks pregnancy  was diagnosed with  COVID  on  presents to the ED with fever chills.    Hospital course:    COVID-19.    -No need to start remedesivir or dexamethasone  -ID consulted: S/p vanco then dc'ed and managed with CTX --> then discontinued   -Diarrhea: stool culture prelim negative, GI PCR negative, C diff not sent as stool formed  -Blood cultures: negative to date   -LE duplex: negative     Pregnancy.   -Gyn consulted   -Fetal monitoring notes a stable heart rate--->+FM and +gross fetal movement.  -Pt reports she takes LDN Naltrexone 4.5mg daily as per her OBG recs which she has not taken for past 2-3 days due to vomiting     Hyponatremia.    -Likely due to nausea/vomiting - N/V improved prior to discharge  -Sodium normalized prior to discharge     Back pain.   -Likely Musculoskeletal  -No neurological or bowel/bladder deficits  -Improvement with Lidocaine patch     Transaminitis.    -Likely secondary to COVID   -Monitored     Hypothyroidism.   -continued home med Levothyroxine.    Need for prophylactic measure.    -Lovenox  -: Per discussion with medical attg Dr. Land, plan for discharge on ASA 81 mg x4 weeks (must be stopped when pt weeks 20 weeks gestation).     Pt medically stable for discharge on  as per discussion with Dr. Land.   Pt ambulated by provider on : at rest on room air pt was 92%, pt remained at 92% with ambulation- no need for home O2 on discharge.  Dispo: home

## 2021-02-22 NOTE — DISCHARGE NOTE NURSING/CASE MANAGEMENT/SOCIAL WORK - NSDCPNINST_GEN_ALL_CORE
Please self-quarantine for 2 weeks following discharge. Monitor for symptoms such as shortness of breath, respiratory distress and/or fever above 100.4. Please maintain prescribed medication daily for 4 weeks.

## 2021-02-22 NOTE — DISCHARGE NOTE NURSING/CASE MANAGEMENT/SOCIAL WORK - PATIENT PORTAL LINK FT
You can access the FollowMyHealth Patient Portal offered by [facility] by registering at the following website: [email or web address]. By joining [facility]’s FollowMyHealth portal, you will also be able to view your health information using other applications (apps) compatible with our system.

## 2021-02-22 NOTE — PROGRESS NOTE ADULT - PROBLEM SELECTOR PLAN 1
-saturating at 97% on RA. Afebrile. Symptoms improved. Not a candidate for Remdesivir or Dexamethasone given no oxygen needs  -given d-dimer level, pregnancy can discharge with 4 weeks of ASA for extended VTEP. Would stop at 20 weeks gestation given risks/benefits.  -continue prenatal vitamin and synthroid upon discharge with OB follow-up.  -repeat LFT's as outpatient.
Stable on RA now @100% sats  No need to start remedesivir or dexamethasone  d/c vanco and start IV ceftriaxone 1 gram q24hrr  await assay findings  presenting with fever, chills, vomiting  and diarrhea--->GI work up pending for c.diff/parasites and common/outlier pathogens  Labs significant  for Bandemia of 20%  DW ID fellow Dr Oseguera- Advise to start Vancomycin,  and FU infectious work up   Discussed with pharmacy- Vancomycin safe to use in pregnancy  Discussed plan with pt  Fu blood cultures, urine cultures, CRP, LDH, D dimer  Monitor Diarrhea- as per pt, no further episode since this AM.  If continues to have diarrhea with ongoing bandemia- consider stool for  C diff   Fu ID consult recs
Dispo: d/c Pt home in the Am  Stable on RA now @100% sats  No need to start remedesivir or dexamethasone  d/c vanco and start IV ceftriaxone 1 gram q24hrr  await assay findings  presenting with fever, chills, vomiting  and diarrhea--->GI work up pending for c.diff/parasites and common/outlier pathogens  Labs significant  for Bandemia of 20%  DW ID fellow Dr Oseguera- Advise to start Vancomycin,  and FU infectious work up   Discussed with pharmacy- Vancomycin safe to use in pregnancy  Discussed plan with pt  Fu blood cultures, urine cultures, CRP, LDH, D dimer  Monitor Diarrhea- as per pt, no further episode since this AM.  If continues to have diarrhea with ongoing bandemia- consider stool for  C diff   Fu ID consult recs

## 2021-02-22 NOTE — DISCHARGE NOTE PROVIDER - NSDCCPCAREPLAN_GEN_ALL_CORE_FT
PRINCIPAL DISCHARGE DIAGNOSIS  Diagnosis: COVID-19  Assessment and Plan of Treatment: You have been diagnosed with the COVID-19 virus during your hospital stay. You must self quarantine to complete a 14 day time period. (from 2/19/2021).  Monitor for fevers, shortness of breath and cough primarily.  Monitor your temperature daily to not any changes and increases.    It has been determined that you no longer need hospitalization and can recover while remaining in self-quarantine at home. You should follow the prevention steps below until a healthcare provider or local or state health department says you can return to your normal activities.  1. You should restrict activities outside your home, except for getting medical care.  2. Do not go to work, school, or public areas.  3. Avoid using public transportation, ride-sharing, or taxis.  4. Separate yourself from other people and animals in your home.  5. Call ahead before visiting your doctor.  6. Wear a facemask.  7. Cover your coughs and sneezes.  8. Clean your hands often.  9. Avoid sharing personal household items.  10. Clean all “high-touch” surfaces everyday.  11. Monitor your symptoms.  If you have a medical emergency and need to call 911, notify the dispatch personnel that you have COVID-19 If possible, put on a facemask before emergency medical services arrive.  12. Stopping home isolation.  Patients with confirmed COVID-19 should remain under home isolation precautions for 14 days since the positive COVID-19 test and until the risk of secondary transmission to others is thought to be low. The decision to discontinue home isolation precautions should be made on a case-by-case basis, in consultation with healthcare providers and state and local health departments. Your [facility] Department of Health can be reached at [phone] for further information about COVID-19.  Please continue with ASA x4 weeks. ASA should be stopped when you reach twenty weeks gestation.      SECONDARY DISCHARGE DIAGNOSES  Diagnosis: Pregnancy  Assessment and Plan of Treatment: You were monitored by OB/Gyn team during hospitalization. It was recommended you do not continue Naltrexone.   Please continue with ASA x4 more weeks (it should be STOPPED when you reach 20 weeks gestation). Routine follow up with your OB/GYN.    Diagnosis: Hypothyroidism  Assessment and Plan of Treatment: Continue with synthroid. Routine monitoring of thyroid function tests.     PRINCIPAL DISCHARGE DIAGNOSIS  Diagnosis: COVID-19  Assessment and Plan of Treatment: You have been diagnosed with the COVID-19 virus during your hospital stay. You must self quarantine to complete a 14 day time period. (from 2/19/2021).  Monitor for fevers, shortness of breath and cough primarily.  Monitor your temperature daily to not any changes and increases.    It has been determined that you no longer need hospitalization and can recover while remaining in self-quarantine at home. You should follow the prevention steps below until a healthcare provider or local or state health department says you can return to your normal activities.  1. You should restrict activities outside your home, except for getting medical care.  2. Do not go to work, school, or public areas.  3. Avoid using public transportation, ride-sharing, or taxis.  4. Separate yourself from other people and animals in your home.  5. Call ahead before visiting your doctor.  6. Wear a facemask.  7. Cover your coughs and sneezes.  8. Clean your hands often.  9. Avoid sharing personal household items.  10. Clean all “high-touch” surfaces everyday.  11. Monitor your symptoms.  If you have a medical emergency and need to call 911, notify the dispatch personnel that you have COVID-19 If possible, put on a facemask before emergency medical services arrive.  12. Stopping home isolation.  Patients with confirmed COVID-19 should remain under home isolation precautions for 14 days since the positive COVID-19 test and until the risk of secondary transmission to others is thought to be low. The decision to discontinue home isolation precautions should be made on a case-by-case basis, in consultation with healthcare providers and state and local health departments. Your [facility] Department of Health can be reached at [phone] for further information about COVID-19.  Please continue with Aspirin 81 mg x4 weeks. Aspirin should be stopped when you reach twenty weeks gestation.      SECONDARY DISCHARGE DIAGNOSES  Diagnosis: Pregnancy  Assessment and Plan of Treatment: You were monitored by OB/Gyn team during hospitalization. It was recommended you do not continue Naltrexone.   Please continue with Aspirin 81 mg x4 more weeks (it should be STOPPED when you reach 20 weeks gestation). Routine follow up with your OB/GYN.    Diagnosis: Hypothyroidism  Assessment and Plan of Treatment: Continue with synthroid. Routine monitoring of thyroid function tests.

## 2021-02-22 NOTE — CHART NOTE - NSCHARTNOTEFT_GEN_A_CORE
Patient seen for FHR check. Pt is 16.6 weeks pregnant denies contx, VB, LOF.  FHR below umbilicus via Doppler is 154bpm.     Ana Lilia TUBBS

## 2021-02-22 NOTE — DISCHARGE NOTE PROVIDER - CARE PROVIDER_API CALL
Persaud, Devicka D  Family Practice  [address] [place] [address]  Phone: [phone]  Fax: [phone]  Follow Up Time:

## 2021-02-22 NOTE — PROGRESS NOTE ADULT - SUBJECTIVE AND OBJECTIVE BOX
Follow Up:      Inverval History/ROS:Patient is a 35y old  Female who presents with a chief complaint of COVID (2021 15:46)    Afebrile for 24 hours  No oxygen requirment  Allergies    No Known Allergies    Intolerances        ANTIMICROBIALS:  cefTRIAXone   IVPB 1000 every 24 hours      OTHER MEDS:  acetaminophen   Tablet .. 650 milliGRAM(s) Oral every 8 hours PRN  aspirin enteric coated 81 milliGRAM(s) Oral daily  enoxaparin Injectable 40 milliGRAM(s) SubCutaneous every 12 hours  influenza   Vaccine 0.5 milliLiter(s) IntraMuscular once  lactated ringers. 1000 milliLiter(s) IV Continuous <Continuous>  levothyroxine 125 MICROGram(s) Oral daily  ondansetron Injectable 4 milliGRAM(s) IV Push every 8 hours PRN  prenatal multivitamin 1 Tablet(s) Oral daily      Vital Signs Last 24 Hrs  T(C): 36.5 (2021 09:02), Max: 37.2 (2021 17:28)  T(F): 97.7 (2021 09:02), Max: 98.9 (2021 17:28)  HR: 96 (2021 09:02) (96 - 106)  BP: 102/57 (2021 09:02) (96/55 - 102/57)  BP(mean): --  RR: 18 (2021 09:02) (18 - 18)  SpO2: 97% (2021 09:02) (97% - 97%)    PHYSICAL EXAM:  General: [ x] non-toxic  HEAD/EYES: [ ] PERRL [ x] white sclera [ ] icterus  ENT:  [ ] normal [x ] supple [ ] thrush [ ] pharyngeal exudate  Cardiovascular:   [ ] murmur [ ] normal [ ] PPM/AICD  Respiratory:  [ x] clear to ausculation bilaterally  GI:  [ ] soft, non-tender, normal bowel sounds  :  [ ] garcia [ ] no CVA tenderness   Musculoskeletal:  [ ] no synovitis  Neurologic:  [ ] non-focal exam   Skin:  x[ ] no rash  Lymph: [ ] no lymphadenopathy  Psychiatric:  [ x] appropriate affect [ ] alert & oriented  Lines:  [ x] no phlebitis [ ] central line                                12.5   7.40  )-----------( 215      ( 2021 04:52 )             38.0           138  |  103  |  4<L>  ----------------------------<  81  3.9   |  22  |  0.57    Ca    8.6      2021 04:52  Phos  3.1       Mg     1.8         TPro  6.1  /  Alb  2.6<L>  /  TBili  <0.2  /  DBili  x   /  AST  68<H>  /  ALT  44<H>  /  AlkPhos  80        Urinalysis Basic - ( 2021 14:31 )    Color: Yellow / Appearance: Clear / S.013 / pH: x  Gluc: x / Ketone: Small  / Bili: Negative / Urobili: <2 mg/dL   Blood: x / Protein: Trace / Nitrite: Negative   Leuk Esterase: Negative / RBC: x / WBC x   Sq Epi: x / Non Sq Epi: x / Bacteria: x        MICROBIOLOGY:Culture Results:   Testing in progress (21 @ 21:48)  Culture Results:   GI PCR Results: NOT detected  *******Please Note:*******  GI panel PCR evaluates for:  Campylobacter, Plesiomonas shigelloides, Salmonella,  Vibrio, Yersinia enterocolitica, Enteroaggregative  Escherichia coli (EAEC), Enteropathogenic E.coli (EPEC),  Enterotoxigenic E. coli (ETEC) lt/st, Shiga-like  toxin-producing E. coli (STEC) stx1/stx2,  Shigella/ Enteroinvasive E. coli (EIEC), Cryptosporidium,  Cyclospora cayetanensis, Entamoeba histolytica,  Giardia lamblia, Adenovirus F 40/41, Astrovirus,  Norovirus GI/GII, Rotavirus A, Sapovirus (21 @ 15:10)  Culture Results:   No growth to date. (21 @ 21:13)  Culture Results:   No growth to date. (21 @ 21:13)      RADIOLOGY:    
Follow Up:      Inverval History/ROS: Patient is a 35y old  Female who presents with a chief complaint of COVID (22 Feb 2021 14:53)    No fever  tolerating po       Allergies    No Known Allergies    Intolerances        ANTIMICROBIALS:      OTHER MEDS:  acetaminophen   Tablet .. 650 milliGRAM(s) Oral every 8 hours PRN  aspirin enteric coated 81 milliGRAM(s) Oral daily  enoxaparin Injectable 40 milliGRAM(s) SubCutaneous every 12 hours  influenza   Vaccine 0.5 milliLiter(s) IntraMuscular once  lactated ringers. 1000 milliLiter(s) IV Continuous <Continuous>  levothyroxine 125 MICROGram(s) Oral daily  ondansetron Injectable 4 milliGRAM(s) IV Push every 8 hours PRN  prenatal multivitamin 1 Tablet(s) Oral daily  sodium chloride 0.65% Nasal 1 Spray(s) Both Nostrils two times a day      Vital Signs Last 24 Hrs  T(C): 36.4 (22 Feb 2021 05:44), Max: 36.7 (21 Feb 2021 23:57)  T(F): 97.5 (22 Feb 2021 05:44), Max: 98.1 (21 Feb 2021 23:57)  HR: 91 (22 Feb 2021 05:44) (91 - 100)  BP: 108/70 (22 Feb 2021 05:44) (104/61 - 110/73)  BP(mean): 85 (21 Feb 2021 19:50) (85 - 85)  RR: 15 (22 Feb 2021 05:44) (15 - 18)  SpO2: 97% (22 Feb 2021 05:44) (94% - 97%)    PHYSICAL EXAM:  General: [ x] non-toxic  HEAD/EYES: [ ] PERRL [x ] white sclera [ ] icterus  ENT:  [ ] normal [ x] supple [ ] thrush [ ] pharyngeal exudate  Cardiovascular:   [ ] murmur [x ] normal [ ] PPM/AICD  Respiratory:  [ x] clear to ausculation bilaterally  GI:  [x ] soft, non-tender, normal bowel sounds  :  [ ] garcia [ ] no CVA tenderness   Musculoskeletal:  [ x] no synovitis  Neurologic:  [ ] non-focal exam   Skin:  [ x] no rash  Lymph: [ ] no lymphadenopathy  Psychiatric:  [ ] appropriate affect [ ] alert & oriented  Lines:  [ ]x no phlebitis [ ] central line                                12.6   6.21  )-----------( 237      ( 22 Feb 2021 07:02 )             38.7       02-22    137  |  103  |  5<L>  ----------------------------<  74  3.9   |  21<L>  |  0.59    Ca    8.7      22 Feb 2021 07:08  Phos  3.2     02-22  Mg     1.9     02-22    TPro  6.4  /  Alb  2.9<L>  /  TBili  0.2  /  DBili  x   /  AST  69<H>  /  ALT  49<H>  /  AlkPhos  90  02-22          MICROBIOLOGY:Culture Results:   Testing in progress (02-20-21 @ 21:48)  Culture Results:   No enteric pathogens to date: Final culture pending (02-20-21 @ 21:06)  Culture Results:   GI PCR Results: NOT detected  *******Please Note:*******  GI panel PCR evaluates for:  Campylobacter, Plesiomonas shigelloides, Salmonella,  Vibrio, Yersinia enterocolitica, Enteroaggregative  Escherichia coli (EAEC), Enteropathogenic E.coli (EPEC),  Enterotoxigenic E. coli (ETEC) lt/st, Shiga-like  toxin-producing E. coli (STEC) stx1/stx2,  Shigella/ Enteroinvasive E. coli (EIEC), Cryptosporidium,  Cyclospora cayetanensis, Entamoeba histolytica,  Giardia lamblia, Adenovirus F 40/41, Astrovirus,  Norovirus GI/GII, Rotavirus A, Sapovirus (02-20-21 @ 15:10)  Culture Results:   No growth to date. (02-19-21 @ 21:13)  Culture Results:   No growth to date. (02-19-21 @ 21:13)      RADIOLOGY:    
Medicine Progress Note    SUBJECTIVE / OVERNIGHT EVENTS:    Patient states she feels okay. States she feels well enough to go home. Denies feeling dyspneic or having chest pain. Denies LE pain or hemoptysis. Agreeable with discharge plans that were outlined to her.    ADDITIONAL REVIEW OF SYSTEMS:    Denies CP or palpitations  Dyspnea improved. Cough improved  Denies fevers  Denies BPR  Denies hematuria.    MEDICATIONS  (STANDING):  aspirin enteric coated 81 milliGRAM(s) Oral daily  enoxaparin Injectable 40 milliGRAM(s) SubCutaneous every 12 hours  influenza   Vaccine 0.5 milliLiter(s) IntraMuscular once  lactated ringers. 1000 milliLiter(s) (100 mL/Hr) IV Continuous <Continuous>  levothyroxine 125 MICROGram(s) Oral daily  prenatal multivitamin 1 Tablet(s) Oral daily  sodium chloride 0.65% Nasal 1 Spray(s) Both Nostrils two times a day    MEDICATIONS  (PRN):  acetaminophen   Tablet .. 650 milliGRAM(s) Oral every 8 hours PRN Temp greater or equal to 38C (100.4F), Mild Pain (1 - 3), Moderate Pain (4 - 6), Severe Pain (7 - 10)  ondansetron Injectable 4 milliGRAM(s) IV Push every 8 hours PRN Nausea and/or Vomiting    CAPILLARY BLOOD GLUCOSE        I&O's Summary      PHYSICAL EXAM:  Vital Signs Last 24 Hrs  T(C): 36.4 (22 Feb 2021 05:44), Max: 36.7 (21 Feb 2021 23:57)  T(F): 97.5 (22 Feb 2021 05:44), Max: 98.1 (21 Feb 2021 23:57)  HR: 91 (22 Feb 2021 05:44) (91 - 100)  BP: 108/70 (22 Feb 2021 05:44) (104/61 - 110/73)  BP(mean): 85 (21 Feb 2021 19:50) (85 - 85)  RR: 15 (22 Feb 2021 05:44) (15 - 18)  SpO2: 97% (22 Feb 2021 05:44) (94% - 97%)  CONSTITUTIONAL: NAD, well-developed, well-groomed  ENMT: Moist oral mucosa, no pharyngeal injection or exudates; normal dentition  RESPIRATORY: Normal respiratory effort; lungs are clear to auscultation bilaterally  CARDIOVASCULAR: Regular rate and rhythm, normal S1 and S2, no murmur/rub/gallop; No lower extremity edema; Peripheral pulses are 2+ bilaterally  ABDOMEN: Nontender to palpation, normoactive bowel sounds, no rebound/guarding; No hepatosplenomegaly  PSYCH: A+O to person, place, and time; affect appropriate  NEUROLOGY: CN 2-12 are intact and symmetric; no gross sensory deficits   SKIN: No rashes; no palpable lesions    LABS:                        12.6   6.21  )-----------( 237      ( 22 Feb 2021 07:02 )             38.7     02-22    137  |  103  |  5<L>  ----------------------------<  74  3.9   |  21<L>  |  0.59    Ca    8.7      22 Feb 2021 07:08  Phos  3.2     02-22  Mg     1.9     02-22    TPro  6.4  /  Alb  2.9<L>  /  TBili  0.2  /  DBili  x   /  AST  69<H>  /  ALT  49<H>  /  AlkPhos  90  02-22              Culture - Stool (collected 20 Feb 2021 21:06)  Source: .Stool Feces  Preliminary Report (21 Feb 2021 17:53):    No enteric pathogens to date: Final culture pending    GI PCR Panel, Stool (collected 20 Feb 2021 15:10)  Source: .Stool Feces  Final Report (20 Feb 2021 23:00):    GI PCR Results: NOT detected    *******Please Note:*******    GI panel PCR evaluates for:    Campylobacter, Plesiomonas shigelloides, Salmonella,    Vibrio, Yersinia enterocolitica, Enteroaggregative    Escherichia coli (EAEC), Enteropathogenic E.coli (EPEC),    Enterotoxigenic E. coli (ETEC) lt/st, Shiga-like    toxin-producing E. coli (STEC) stx1/stx2,    Shigella/ Enteroinvasive E. coli (EIEC), Cryptosporidium,    Cyclospora cayetanensis, Entamoeba histolytica,    Giardia lamblia, Adenovirus F 40/41, Astrovirus,    Norovirus GI/GII, Rotavirus A, Sapovirus    Culture - Blood (collected 19 Feb 2021 21:13)  Source: .Blood Blood-Peripheral  Preliminary Report (20 Feb 2021 22:02):    No growth to date.    Culture - Blood (collected 19 Feb 2021 21:13)  Source: .Blood Blood  Preliminary Report (20 Feb 2021 22:02):    No growth to date.      COVID-19 PCR: Detected (19 Feb 2021 12:55)      RADIOLOGY & ADDITIONAL TESTS:  Imaging from Last 24 Hours:    Electrocardiogram/QTc Interval:    COORDINATION OF CARE:  Care Discussed with Consultants/Other Providers:  
Patient is a 35y old  Female who presents with a chief complaint of COVID-19 (2021 08:32)      SUBJECTIVE / OVERNIGHT EVENTS: Pt seen and examined at the bedside today; labs/chart/imaging were reviewed. Pt denies severe sob, cp but with some fever and chills. Reports no bouts of diarrhea.  States that she has been consuming some of her meals as well as hydrating.    MEDICATIONS  (STANDING):  aspirin enteric coated 81 milliGRAM(s) Oral daily  enoxaparin Injectable 40 milliGRAM(s) SubCutaneous every 12 hours  influenza   Vaccine 0.5 milliLiter(s) IntraMuscular once  lactated ringers. 1000 milliLiter(s) (100 mL/Hr) IV Continuous <Continuous>  levothyroxine 125 MICROGram(s) Oral daily  prenatal multivitamin 1 Tablet(s) Oral daily    MEDICATIONS  (PRN):  acetaminophen   Tablet .. 650 milliGRAM(s) Oral every 8 hours PRN Temp greater or equal to 38C (100.4F), Mild Pain (1 - 3), Moderate Pain (4 - 6), Severe Pain (7 - 10)  ondansetron Injectable 4 milliGRAM(s) IV Push every 8 hours PRN Nausea and/or Vomiting      PHYSICAL EXAM:  Vital Signs Last 24 Hrs  T(C): 36.9 (2021 10:10), Max: 37.6 (2021 19:45)  T(F): 98.4 (2021 10:10), Max: 99.7 (2021 19:45)  HR: 104 (2021 10:10) (99 - 104)  BP: 100/60 (2021 10:10) (100/60 - 108/64)  BP(mean): --  RR: 18 (2021 10:10) (18 - 18)  SpO2: 98% (2021 10:10) (97% - 100%)    CONSTITUTIONAL: NAD, well-developed, well-groomed  RESPIRATORY: Normal respiratory effort; lungs are clear to auscultation bilaterally  CARDIOVASCULAR: Regular rate and rhythm, normal S1 and S2, no murmur/rub/gallop; No lower extremity edema  GASTROINTESTINAL: Nontender to palpation, normoactive bowel sounds, no rebound/guarding; No hepatosplenomegaly  MUSCULOSKELETAL:  no clubbing or cyanosis of digits; no joint swelling or tenderness to palpation  NEUROLOGY: non-focal; no gross sensory deficits   PSYCH: A+O to person, place, and time; affect appropriate  SKIN: No rashes; warm     LABS:                        12.0   8.01  )-----------( 187      ( 2021 06:49 )             37.4     02-20    133<L>  |  101  |  5<L>  ----------------------------<  80  3.9   |  20<L>  |  0.61    Ca    8.0<L>      2021 06:49    TPro  5.9<L>  /  Alb  2.9<L>  /  TBili  0.2  /  DBili  x   /  AST  77<H>  /  ALT  45<H>  /  AlkPhos  69  02-20          Urinalysis Basic - ( 2021 14:31 )    Color: Yellow / Appearance: Clear / S.013 / pH: x  Gluc: x / Ketone: Small  / Bili: Negative / Urobili: <2 mg/dL   Blood: x / Protein: Trace / Nitrite: Negative   Leuk Esterase: Negative / RBC: x / WBC x   Sq Epi: x / Non Sq Epi: x / Bacteria: x          RADIOLOGY & ADDITIONAL TESTS:  Results Reviewed:   Imaging Personally Reviewed:  Electrocardiogram Personally Reviewed:    COORDINATION OF CARE:  Care Discussed with Consultants/Other Providers [Y/N]:  Prior or Outpatient Records Reviewed [Y/N]:  
Patient is a 35y old  Female who presents with a chief complaint of COVID (21 Feb 2021 11:39)      SUBJECTIVE / OVERNIGHT EVENTS: Pt examined at the bedside; labs/chart reviewed. Pt noted to be on RA sats@98%.    MEDICATIONS  (STANDING):  aspirin enteric coated 81 milliGRAM(s) Oral daily  enoxaparin Injectable 40 milliGRAM(s) SubCutaneous every 12 hours  influenza   Vaccine 0.5 milliLiter(s) IntraMuscular once  lactated ringers. 1000 milliLiter(s) (100 mL/Hr) IV Continuous <Continuous>  levothyroxine 125 MICROGram(s) Oral daily  prenatal multivitamin 1 Tablet(s) Oral daily    MEDICATIONS  (PRN):  acetaminophen   Tablet .. 650 milliGRAM(s) Oral every 8 hours PRN Temp greater or equal to 38C (100.4F), Mild Pain (1 - 3), Moderate Pain (4 - 6), Severe Pain (7 - 10)  ondansetron Injectable 4 milliGRAM(s) IV Push every 8 hours PRN Nausea and/or Vomiting      PHYSICAL EXAM:  Vital Signs Last 24 Hrs  T(C): 36.5 (21 Feb 2021 09:02), Max: 36.5 (21 Feb 2021 09:02)  T(F): 97.7 (21 Feb 2021 09:02), Max: 97.7 (21 Feb 2021 09:02)  HR: 96 (21 Feb 2021 09:02) (96 - 96)  BP: 102/57 (21 Feb 2021 09:02) (102/57 - 102/57)  BP(mean): --  RR: 18 (21 Feb 2021 09:02) (18 - 18)  SpO2: 97% (21 Feb 2021 09:02) (97% - 97%)    CONSTITUTIONAL: NAD, well-developed, well-groomed  RESPIRATORY: Normal respiratory effort; lungs are clear to auscultation bilaterally  CARDIOVASCULAR: Regular rate and rhythm, normal S1 and S2, no murmur/rub/gallop; No lower extremity edema  GASTROINTESTINAL: Nontender to palpation, normoactive bowel sounds, no rebound/guarding; No hepatosplenomegaly  MUSCULOSKELETAL:  no clubbing or cyanosis of digits; no joint swelling or tenderness to palpation  NEUROLOGY: non-focal; no gross sensory deficits   PSYCH: A+O to person, place, and time; affect appropriate  SKIN: No rashes; warm     LABS:                        12.5   7.40  )-----------( 215      ( 21 Feb 2021 04:52 )             38.0     02-21    138  |  103  |  4<L>  ----------------------------<  81  3.9   |  22  |  0.57    Ca    8.6      21 Feb 2021 04:52  Phos  3.1     02-21  Mg     1.8     02-21    TPro  6.1  /  Alb  2.6<L>  /  TBili  <0.2  /  DBili  x   /  AST  68<H>  /  ALT  44<H>  /  AlkPhos  80  02-21              Culture - Stool (collected 20 Feb 2021 21:06)  Source: .Stool Feces  Preliminary Report (21 Feb 2021 17:53):    No enteric pathogens to date: Final culture pending    GI PCR Panel, Stool (collected 20 Feb 2021 15:10)  Source: .Stool Feces  Final Report (20 Feb 2021 23:00):    GI PCR Results: NOT detected    *******Please Note:*******    GI panel PCR evaluates for:    Campylobacter, Plesiomonas shigelloides, Salmonella,    Vibrio, Yersinia enterocolitica, Enteroaggregative    Escherichia coli (EAEC), Enteropathogenic E.coli (EPEC),    Enterotoxigenic E. coli (ETEC) lt/st, Shiga-like    toxin-producing E. coli (STEC) stx1/stx2,    Shigella/ Enteroinvasive E. coli (EIEC), Cryptosporidium,    Cyclospora cayetanensis, Entamoeba histolytica,    Giardia lamblia, Adenovirus F 40/41, Astrovirus,    Norovirus GI/GII, Rotavirus A, Sapovirus    Culture - Blood (collected 19 Feb 2021 21:13)  Source: .Blood Blood-Peripheral  Preliminary Report (20 Feb 2021 22:02):    No growth to date.    Culture - Blood (collected 19 Feb 2021 21:13)  Source: .Blood Blood  Preliminary Report (20 Feb 2021 22:02):    No growth to date.        RADIOLOGY & ADDITIONAL TESTS:  Results Reviewed:   Imaging Personally Reviewed:  Electrocardiogram Personally Reviewed:    COORDINATION OF CARE:  Care Discussed with Consultants/Other Providers [Y/N]:  Prior or Outpatient Records Reviewed [Y/N]:

## 2021-02-22 NOTE — DISCHARGE NOTE PROVIDER - NSDCMRMEDTOKEN_GEN_ALL_CORE_FT
acetaminophen 325 mg oral tablet: 2 tab(s) orally every 8 hours, As needed, Temp greater or equal to 38C (100.4F), Mild Pain (1 - 3), Moderate Pain (4 - 6), Severe Pain (7 - 10)  Levothroid 125 mcg (0.125 mg) oral tablet: 1 tab(s) orally once a day  Prenatal Multivitamins with Folic Acid 1 mg oral tablet: 1 tab(s) orally once a day  sodium chloride 0.65% nasal spray: 1 spray(s) nasal 2 times a day to assist with nasal congestion   acetaminophen 325 mg oral tablet: 2 tab(s) orally every 8 hours, As needed, Temp greater or equal to 38C (100.4F), Mild Pain (1 - 3), Moderate Pain (4 - 6), Severe Pain (7 - 10)  aspirin 81 mg oral delayed release tablet: 1 tab(s) orally once a day  Levothroid 125 mcg (0.125 mg) oral tablet: 1 tab(s) orally once a day  Prenatal Multivitamins with Folic Acid 1 mg oral tablet: 1 tab(s) orally once a day  sodium chloride 0.65% nasal spray: 1 spray(s) nasal 2 times a day to assist with nasal congestion

## 2021-02-22 NOTE — PROGRESS NOTE ADULT - ASSESSMENT
35 year old at 17 weeks gestation presents with fever, myalgia due to COVID.    1) COVID  Symptom onset on 2/12  COVID positive on 2/14    She is not hypoxic- no indication for remdesvir or steroids    2) Bandemia  ? if this is real    Repeat CBC with manual diff    Change to ceftriaxone 1 gram iv daily  If diff on CBC is without bands, can d/c ceftriaxone    3) Diarrhea  likely due to covid  C diff/ GI pcr is negative    4) Transaminitis  likely due to COVID    5) Tachycardia  monitor Heart rate  Check LE dopplers  repeat D dimer      Supportive care
35 year old at 17 weeks gestation presents with fever, myalgia due to COVID.    1) COVID  Symptom onset on 2/12  COVID positive on 2/14    She is not hypoxic- no indication for remdesvir or steroids    2) Bandemia  Resolved    3) Diarrhea  likely due to covid  C diff/ GI pcr is negative    4) Transaminitis  likely due to COVID    Supportive care  Will sign off  
35 year old female with hypothyrodism who's 16 weeks pregnant here with COVID
35F  16 weeks pregnancy  was diagnosed with  COVID  on  presents to the ED with fever chills  
35F  16 weeks pregnancy  was diagnosed with  COVID  on  presents to the ED with fever chills

## 2021-02-23 ENCOUNTER — TRANSCRIPTION ENCOUNTER (OUTPATIENT)
Age: 36
End: 2021-02-23

## 2021-02-24 LAB
CULTURE RESULTS: SIGNIFICANT CHANGE UP
CULTURE RESULTS: SIGNIFICANT CHANGE UP
SPECIMEN SOURCE: SIGNIFICANT CHANGE UP
SPECIMEN SOURCE: SIGNIFICANT CHANGE UP

## 2021-02-26 PROBLEM — E03.9 HYPOTHYROIDISM, UNSPECIFIED: Chronic | Status: ACTIVE | Noted: 2021-02-19

## 2021-03-05 ENCOUNTER — TRANSCRIPTION ENCOUNTER (OUTPATIENT)
Age: 36
End: 2021-03-05

## 2021-03-08 ENCOUNTER — APPOINTMENT (OUTPATIENT)
Dept: ANTEPARTUM | Facility: CLINIC | Age: 36
End: 2021-03-08
Payer: COMMERCIAL

## 2021-03-08 PROCEDURE — 0502F SUBSEQUENT PRENATAL CARE: CPT

## 2021-03-24 ENCOUNTER — APPOINTMENT (OUTPATIENT)
Dept: ANTEPARTUM | Facility: CLINIC | Age: 36
End: 2021-03-24
Payer: COMMERCIAL

## 2021-03-24 PROCEDURE — 99072 ADDL SUPL MATRL&STAF TM PHE: CPT

## 2021-03-24 PROCEDURE — 76816 OB US FOLLOW-UP PER FETUS: CPT

## 2021-04-03 ENCOUNTER — APPOINTMENT (OUTPATIENT)
Dept: ANTEPARTUM | Facility: CLINIC | Age: 36
End: 2021-04-03
Payer: COMMERCIAL

## 2021-04-03 PROCEDURE — 99072 ADDL SUPL MATRL&STAF TM PHE: CPT

## 2021-04-03 PROCEDURE — 76815 OB US LIMITED FETUS(S): CPT

## 2021-07-01 ENCOUNTER — NON-APPOINTMENT (OUTPATIENT)
Age: 36
End: 2021-07-01

## 2021-07-01 ENCOUNTER — APPOINTMENT (OUTPATIENT)
Dept: ANTEPARTUM | Facility: CLINIC | Age: 36
End: 2021-07-01
Payer: COMMERCIAL

## 2021-07-01 ENCOUNTER — TRANSCRIPTION ENCOUNTER (OUTPATIENT)
Age: 36
End: 2021-07-01

## 2021-07-01 PROCEDURE — 99072 ADDL SUPL MATRL&STAF TM PHE: CPT

## 2021-07-01 PROCEDURE — 76818 FETAL BIOPHYS PROFILE W/NST: CPT

## 2021-07-01 PROCEDURE — 76816 OB US FOLLOW-UP PER FETUS: CPT

## 2021-07-19 ENCOUNTER — TRANSCRIPTION ENCOUNTER (OUTPATIENT)
Age: 36
End: 2021-07-19

## 2021-07-19 ENCOUNTER — INPATIENT (INPATIENT)
Facility: HOSPITAL | Age: 36
LOS: 0 days | Discharge: HOME | End: 2021-07-20
Attending: OBSTETRICS & GYNECOLOGY | Admitting: OBSTETRICS & GYNECOLOGY

## 2021-07-19 VITALS — DIASTOLIC BLOOD PRESSURE: 69 MMHG | HEART RATE: 96 BPM | SYSTOLIC BLOOD PRESSURE: 108 MMHG

## 2021-07-19 DIAGNOSIS — O26.899 OTHER SPECIFIED PREGNANCY RELATED CONDITIONS, UNSPECIFIED TRIMESTER: ICD-10-CM

## 2021-07-19 DIAGNOSIS — Z3A.00 WEEKS OF GESTATION OF PREGNANCY NOT SPECIFIED: ICD-10-CM

## 2021-07-19 LAB
BASOPHILS # BLD AUTO: 0.06 K/UL — SIGNIFICANT CHANGE UP (ref 0–0.2)
BASOPHILS NFR BLD AUTO: 0.4 % — SIGNIFICANT CHANGE UP (ref 0–2)
BLD GP AB SCN SERPL QL: NEGATIVE — SIGNIFICANT CHANGE UP
COVID-19 SPIKE DOMAIN AB INTERP: POSITIVE
COVID-19 SPIKE DOMAIN ANTIBODY RESULT: >250 U/ML — HIGH
EOSINOPHIL # BLD AUTO: 0.21 K/UL — SIGNIFICANT CHANGE UP (ref 0–0.5)
EOSINOPHIL NFR BLD AUTO: 1.4 % — SIGNIFICANT CHANGE UP (ref 0–6)
HCT VFR BLD CALC: 35.2 % — SIGNIFICANT CHANGE UP (ref 34.5–45)
HGB BLD-MCNC: 11.6 G/DL — SIGNIFICANT CHANGE UP (ref 11.5–15.5)
IANC: 9.92 K/UL — HIGH (ref 1.5–8.5)
IMM GRANULOCYTES NFR BLD AUTO: 1.6 % — HIGH (ref 0–1.5)
LYMPHOCYTES # BLD AUTO: 23.2 % — SIGNIFICANT CHANGE UP (ref 13–44)
LYMPHOCYTES # BLD AUTO: 3.55 K/UL — HIGH (ref 1–3.3)
MCHC RBC-ENTMCNC: 27.7 PG — SIGNIFICANT CHANGE UP (ref 27–34)
MCHC RBC-ENTMCNC: 33 GM/DL — SIGNIFICANT CHANGE UP (ref 32–36)
MCV RBC AUTO: 84 FL — SIGNIFICANT CHANGE UP (ref 80–100)
MONOCYTES # BLD AUTO: 1.31 K/UL — HIGH (ref 0–0.9)
MONOCYTES NFR BLD AUTO: 8.6 % — SIGNIFICANT CHANGE UP (ref 2–14)
NEUTROPHILS # BLD AUTO: 9.92 K/UL — HIGH (ref 1.8–7.4)
NEUTROPHILS NFR BLD AUTO: 64.8 % — SIGNIFICANT CHANGE UP (ref 43–77)
NRBC # BLD: 0 /100 WBCS — SIGNIFICANT CHANGE UP
NRBC # FLD: 0 K/UL — SIGNIFICANT CHANGE UP
PLATELET # BLD AUTO: 336 K/UL — SIGNIFICANT CHANGE UP (ref 150–400)
RBC # BLD: 4.19 M/UL — SIGNIFICANT CHANGE UP (ref 3.8–5.2)
RBC # FLD: 13.4 % — SIGNIFICANT CHANGE UP (ref 10.3–14.5)
RH IG SCN BLD-IMP: POSITIVE — SIGNIFICANT CHANGE UP
RH IG SCN BLD-IMP: POSITIVE — SIGNIFICANT CHANGE UP
SARS-COV-2 IGG+IGM SERPL QL IA: >250 U/ML — HIGH
SARS-COV-2 IGG+IGM SERPL QL IA: POSITIVE
SARS-COV-2 RNA SPEC QL NAA+PROBE: SIGNIFICANT CHANGE UP
T PALLIDUM AB TITR SER: NEGATIVE — SIGNIFICANT CHANGE UP
WBC # BLD: 15.3 K/UL — HIGH (ref 3.8–10.5)
WBC # FLD AUTO: 15.3 K/UL — HIGH (ref 3.8–10.5)

## 2021-07-19 RX ORDER — SODIUM CHLORIDE 9 MG/ML
1000 INJECTION, SOLUTION INTRAVENOUS
Refills: 0 | Status: DISCONTINUED | OUTPATIENT
Start: 2021-07-19 | End: 2021-07-19

## 2021-07-19 RX ORDER — IBUPROFEN 200 MG
600 TABLET ORAL EVERY 6 HOURS
Refills: 0 | Status: COMPLETED | OUTPATIENT
Start: 2021-07-19 | End: 2022-06-17

## 2021-07-19 RX ORDER — SODIUM CHLORIDE 9 MG/ML
3 INJECTION INTRAMUSCULAR; INTRAVENOUS; SUBCUTANEOUS EVERY 8 HOURS
Refills: 0 | Status: DISCONTINUED | OUTPATIENT
Start: 2021-07-19 | End: 2021-07-20

## 2021-07-19 RX ORDER — OXYCODONE HYDROCHLORIDE 5 MG/1
5 TABLET ORAL
Refills: 0 | Status: DISCONTINUED | OUTPATIENT
Start: 2021-07-19 | End: 2021-07-20

## 2021-07-19 RX ORDER — SODIUM CHLORIDE 9 MG/ML
1000 INJECTION, SOLUTION INTRAVENOUS
Refills: 0 | Status: DISCONTINUED | OUTPATIENT
Start: 2021-07-19 | End: 2021-07-20

## 2021-07-19 RX ORDER — DIPHENHYDRAMINE HCL 50 MG
25 CAPSULE ORAL EVERY 6 HOURS
Refills: 0 | Status: DISCONTINUED | OUTPATIENT
Start: 2021-07-19 | End: 2021-07-20

## 2021-07-19 RX ORDER — HYDROCORTISONE 1 %
1 OINTMENT (GRAM) TOPICAL EVERY 6 HOURS
Refills: 0 | Status: DISCONTINUED | OUTPATIENT
Start: 2021-07-19 | End: 2021-07-20

## 2021-07-19 RX ORDER — OXYCODONE HYDROCHLORIDE 5 MG/1
5 TABLET ORAL ONCE
Refills: 0 | Status: DISCONTINUED | OUTPATIENT
Start: 2021-07-19 | End: 2021-07-20

## 2021-07-19 RX ORDER — BENZOCAINE 10 %
1 GEL (GRAM) MUCOUS MEMBRANE EVERY 6 HOURS
Refills: 0 | Status: DISCONTINUED | OUTPATIENT
Start: 2021-07-19 | End: 2021-07-20

## 2021-07-19 RX ORDER — LEVOTHYROXINE SODIUM 125 MCG
125 TABLET ORAL DAILY
Refills: 0 | Status: DISCONTINUED | OUTPATIENT
Start: 2021-07-19 | End: 2021-07-20

## 2021-07-19 RX ORDER — DIBUCAINE 1 %
1 OINTMENT (GRAM) RECTAL EVERY 6 HOURS
Refills: 0 | Status: DISCONTINUED | OUTPATIENT
Start: 2021-07-19 | End: 2021-07-20

## 2021-07-19 RX ORDER — SIMETHICONE 80 MG/1
80 TABLET, CHEWABLE ORAL EVERY 4 HOURS
Refills: 0 | Status: DISCONTINUED | OUTPATIENT
Start: 2021-07-19 | End: 2021-07-20

## 2021-07-19 RX ORDER — CITRIC ACID/SODIUM CITRATE 300-500 MG
15 SOLUTION, ORAL ORAL EVERY 6 HOURS
Refills: 0 | Status: DISCONTINUED | OUTPATIENT
Start: 2021-07-19 | End: 2021-07-19

## 2021-07-19 RX ORDER — IBUPROFEN 200 MG
600 TABLET ORAL EVERY 6 HOURS
Refills: 0 | Status: DISCONTINUED | OUTPATIENT
Start: 2021-07-19 | End: 2021-07-20

## 2021-07-19 RX ORDER — SODIUM CHLORIDE 9 MG/ML
1000 INJECTION, SOLUTION INTRAVENOUS ONCE
Refills: 0 | Status: COMPLETED | OUTPATIENT
Start: 2021-07-19 | End: 2021-07-19

## 2021-07-19 RX ORDER — ASPIRIN/CALCIUM CARB/MAGNESIUM 324 MG
81 TABLET ORAL DAILY
Refills: 0 | Status: DISCONTINUED | OUTPATIENT
Start: 2021-07-19 | End: 2021-07-20

## 2021-07-19 RX ORDER — PRAMOXINE HYDROCHLORIDE 150 MG/15G
1 AEROSOL, FOAM RECTAL EVERY 4 HOURS
Refills: 0 | Status: DISCONTINUED | OUTPATIENT
Start: 2021-07-19 | End: 2021-07-20

## 2021-07-19 RX ORDER — OXYTOCIN 10 UNIT/ML
VIAL (ML) INJECTION
Qty: 20 | Refills: 0 | Status: DISCONTINUED | OUTPATIENT
Start: 2021-07-19 | End: 2021-07-19

## 2021-07-19 RX ORDER — AER TRAVELER 0.5 G/1
1 SOLUTION RECTAL; TOPICAL EVERY 4 HOURS
Refills: 0 | Status: DISCONTINUED | OUTPATIENT
Start: 2021-07-19 | End: 2021-07-20

## 2021-07-19 RX ORDER — OXYTOCIN 10 UNIT/ML
333.33 VIAL (ML) INJECTION
Qty: 20 | Refills: 0 | Status: DISCONTINUED | OUTPATIENT
Start: 2021-07-19 | End: 2021-07-19

## 2021-07-19 RX ORDER — MAGNESIUM HYDROXIDE 400 MG/1
30 TABLET, CHEWABLE ORAL
Refills: 0 | Status: DISCONTINUED | OUTPATIENT
Start: 2021-07-19 | End: 2021-07-20

## 2021-07-19 RX ORDER — KETOROLAC TROMETHAMINE 30 MG/ML
30 SYRINGE (ML) INJECTION ONCE
Refills: 0 | Status: DISCONTINUED | OUTPATIENT
Start: 2021-07-19 | End: 2021-07-20

## 2021-07-19 RX ORDER — ACETAMINOPHEN 500 MG
975 TABLET ORAL
Refills: 0 | Status: DISCONTINUED | OUTPATIENT
Start: 2021-07-19 | End: 2021-07-20

## 2021-07-19 RX ORDER — LANOLIN
1 OINTMENT (GRAM) TOPICAL EVERY 6 HOURS
Refills: 0 | Status: DISCONTINUED | OUTPATIENT
Start: 2021-07-19 | End: 2021-07-20

## 2021-07-19 RX ORDER — TETANUS TOXOID, REDUCED DIPHTHERIA TOXOID AND ACELLULAR PERTUSSIS VACCINE, ADSORBED 5; 2.5; 8; 8; 2.5 [IU]/.5ML; [IU]/.5ML; UG/.5ML; UG/.5ML; UG/.5ML
0.5 SUSPENSION INTRAMUSCULAR ONCE
Refills: 0 | Status: DISCONTINUED | OUTPATIENT
Start: 2021-07-19 | End: 2021-07-20

## 2021-07-19 RX ADMIN — Medication 975 MILLIGRAM(S): at 12:45

## 2021-07-19 RX ADMIN — SODIUM CHLORIDE 3 MILLILITER(S): 9 INJECTION INTRAMUSCULAR; INTRAVENOUS; SUBCUTANEOUS at 15:02

## 2021-07-19 RX ADMIN — Medication 975 MILLIGRAM(S): at 15:00

## 2021-07-19 RX ADMIN — SODIUM CHLORIDE 1000 MILLILITER(S): 9 INJECTION, SOLUTION INTRAVENOUS at 05:30

## 2021-07-19 RX ADMIN — Medication 1 TABLET(S): at 15:01

## 2021-07-19 RX ADMIN — Medication 1000 MILLIUNIT(S)/MIN: at 06:56

## 2021-07-19 RX ADMIN — Medication 0.2 MILLIGRAM(S): at 05:21

## 2021-07-19 RX ADMIN — Medication 125 MICROGRAM(S): at 06:56

## 2021-07-19 NOTE — OB RN DELIVERY SUMMARY - NSSELHIDDEN_OBGYN_ALL_OB_FT
[NS_DeliveryAttending1_OBGYN_ALL_OB_FT:[id number]],[NS_DeliveryAssist1_OBGYN_ALL_OB_FT:[id number]=],[NS_DeliveryRN_OBGYN_ALL_OB_FT:[id number]=]

## 2021-07-19 NOTE — OB PROVIDER TRIAGE NOTE - NS_ABDFINDING_OBGYN_ALL_OB
Problem: Wound:  Goal: Will show signs of wound healing; wound closure and no evidence of infection  Description  Will show signs of wound healing; wound closure and no evidence of infection   Outcome: Ongoing     Problem: Wound:  Intervention: Assess pain status  Note:   See flowsheet  Intervention: Assess wound size, appearance and drainage  Note:   See flowsheet  Intervention: Assess pedal pulses bilaterally if patient has a foot or leg ulcer  Note:   See flowsheet Soft, nontender

## 2021-07-19 NOTE — OB POSTPARTUM EVENT NOTE - NS_EVENTSUMMARY1_OBGYN_ALL_OB_FT
Pt  s/p precipitous delivery at 0431.  and 2 degree tear. Rn performing 30 minute fundal check on patient; Boggy uterus noted; Patient passing multiple clots and heavy vaginal bleeding noted. OB providers called to bedside via [employer]

## 2021-07-19 NOTE — DISCHARGE NOTE OB - MEDICATION SUMMARY - MEDICATIONS TO STOP TAKING
I will STOP taking the medications listed below when I get home from the hospital:    aspirin 81 mg oral delayed release tablet  -- 1 tab(s) by mouth once a day

## 2021-07-19 NOTE — OB PROVIDER TRIAGE NOTE - NSHPPHYSICALEXAM_GEN_ALL_CORE
Vital Signs Last 24 Hrs  T(C): 36.7 (19 Jul 2021 03:05), Max: 36.7 (19 Jul 2021 03:02)  T(F): 98.1 (19 Jul 2021 03:05), Max: 98.1 (19 Jul 2021 03:05)  HR: 96 (19 Jul 2021 03:05) (96 - 96)  BP: 108/69 (19 Jul 2021 03:05) (108/69 - 108/69)  BP(mean): --  RR: 16 (19 Jul 2021 03:05) (16 - 16)  SpO2: --    TOCO: ctx irregular  VE: Large pooling, Nitrazine positive, 2/90/-1  US: vertex, presentation only

## 2021-07-19 NOTE — OB PROVIDER TRIAGE NOTE - HISTORY OF PRESENT ILLNESS
@ 38-1 wks, presents with Ctx , with LOF @ 0220 am. Denies VB/ Reports positive fetal movement. GBS negaive. EFW 6.11 lbs  PNI: Denies  Gynhx: denies   Surghx: denies   Medhx: Thyroid disorder, synthroid 125 mcg, blood transfusion as , unsure indication

## 2021-07-19 NOTE — OB PROVIDER DELIVERY SUMMARY - NSPROVIDERDELIVERYNOTE_OBGYN_ALL_OB_FT
Attending Note     Pt progressed to 10/100/+3   Pushed vertex over intact perineum followed by the body and shoulders  Delayed cord clamping performed   Placenta delivered intact  Vagina, rectum and cervix inspected   2nd degree laceration noted and repaired in usual fashion      Girl     R Nolvia GUDINO

## 2021-07-19 NOTE — OB PROVIDER DELIVERY SUMMARY - NSSELHIDDEN_OBGYN_ALL_OB_FT
[NS_DeliveryAttending1_OBGYN_ALL_OB_FT:[id number]=],[NS_DeliveryAssist1_OBGYN_ALL_OB_FT:[id number]=]

## 2021-07-19 NOTE — CHART NOTE - NSCHARTNOTEFT_GEN_A_CORE
Attending Note     Was called to the room for bleeding   Uterine atony noted   Resolved with Methergine and straight cath to empty bladder  thin endometrial stripe noted   will give 1 L bolus and continue to monitor     NADIA De Souza MD

## 2021-07-19 NOTE — OB POSTPARTUM EVENT NOTE - NS_EVENTFINDINGS1_OBGYN_ALL_OB_FT
Dr. Meier and Dr. Boothe at bedside with sono; Pt straight cathed- 400 ml urine; IM methergine 0.2 mg administered; 1L LR bolus to be given; No methergine series as per Dr. Reji Stout; Rn to continue to monitor vaginal bleeding and fundus. Additional 150 ml blood loss as per Dr. Reji Stout

## 2021-07-19 NOTE — OB NEONATOLOGY/PEDIATRICIAN DELIVERY SUMMARY - NSPEDSNEONOTESA_OBGYN_ALL_OB_FT
Baby girl born at 38.1 wks via  to a 35y/o  O+blood type mother. Maternal history of hypothyroid (on synthroid) and blood transfusions as a  (unsure of why).  PNL nr/immune/-, GBS unknown. SROM at 2:00 with clear fluids. Baby emerged vigorous, crying, was w/d/s/s with APGARS of 8/9 . Mom would like to breast feed, declines Hep B and EOS 0.06.     BW: 2820  :   TOB: 04:31

## 2021-07-19 NOTE — DISCHARGE NOTE OB - CARE PROVIDER_API CALL
Gris Dodd  OBSTETRICS AND GYNECOLOGY  [address], [address]  [place] [address]  Phone: [phone]  Fax: [phone]  Follow Up Time:

## 2021-07-19 NOTE — OB RN DELIVERY SUMMARY - NS_SEPSISRSKCALC_OBGYN_ALL_OB_FT
EOS calculated successfully. EOS Risk Factor: 0.5/1000 live births ([facility] national incidence); GA=38w1d; Temp=98.1; ROM=2.517; GBS='Negative'; Antibiotics='No antibiotics or any antibiotics < 2 hrs prior to birth'

## 2021-07-19 NOTE — OB PROVIDER TRIAGE NOTE - NSOBPROVIDERNOTE_OBGYN_ALL_OB_FT
@ 38-1 wks,   Admit  Labor/ROM- clear  GBS negative  Expectant management  Epidural  COVID Testing   Partner vaccine verification done  Discussed with Dr. Charles

## 2021-07-19 NOTE — DISCHARGE NOTE OB - PATIENT PORTAL LINK FT
You can access the FollowMyHealth Patient Portal offered by [facility] by registering at the following website: [email or web address]. By joining [employer]’s FollowMyHealth portal, you will also be able to view your health information using other applications (apps) compatible with our system.

## 2021-07-19 NOTE — LACTATION INITIAL EVALUATION - LACTATION INTERVENTIONS
assisted to put baby to both breasts in football hold position with deep latch and baby noted to suck with stimultion/initiate/review safe skin-to-skin/initiate/review hand expression/initiate/review techniques for position and latch/initiate/review breast massage/compression/reviewed components of an effective feeding and at least 8 effective feedings per day required/reviewed importance of monitoring infant diapers, the breastfeeding log, and minimum output each day/reviewed risks of unnecessary formula supplementation/reviewed risks of artificial nipples/reviewed feeding on demand/by cue at least 8 times a day/reviewed indications of inadequate milk transfer that would require supplementation

## 2021-07-20 VITALS
OXYGEN SATURATION: 97 % | DIASTOLIC BLOOD PRESSURE: 63 MMHG | RESPIRATION RATE: 18 BRPM | HEART RATE: 88 BPM | TEMPERATURE: 99 F | SYSTOLIC BLOOD PRESSURE: 100 MMHG

## 2021-07-20 RX ORDER — IBUPROFEN 200 MG
1 TABLET ORAL
Qty: 0 | Refills: 0 | DISCHARGE
Start: 2021-07-20

## 2021-07-20 RX ORDER — LEVOTHYROXINE SODIUM 125 MCG
1 TABLET ORAL
Qty: 0 | Refills: 0 | DISCHARGE

## 2021-07-20 RX ADMIN — Medication 1 TABLET(S): at 11:51

## 2021-07-20 RX ADMIN — SODIUM CHLORIDE 3 MILLILITER(S): 9 INJECTION INTRAMUSCULAR; INTRAVENOUS; SUBCUTANEOUS at 05:52

## 2021-07-20 RX ADMIN — Medication 975 MILLIGRAM(S): at 02:52

## 2021-07-20 RX ADMIN — Medication 125 MICROGRAM(S): at 06:08

## 2021-07-20 RX ADMIN — Medication 975 MILLIGRAM(S): at 03:52

## 2021-07-20 RX ADMIN — Medication 600 MILLIGRAM(S): at 10:51

## 2021-07-20 NOTE — PROGRESS NOTE ADULT - SUBJECTIVE AND OBJECTIVE BOX
S: Patient doing well. Minimal lochia. Pain controlled.    O: Vital Signs Last 24 Hrs  T(C): 37.1 (2021 06:00), Max: 37.1 (2021 06:00)  T(F): 98.8 (2021 06:00), Max: 98.8 (2021 06:00)  HR: 88 (2021 06:00) (88 - 99)  BP: 100/63 (2021 06:00) (100/63 - 114/69)  BP(mean): --  RR: 18 (2021 06:00) (18 - 19)  SpO2: 97% (2021 06:00) (97% - 99%)    Gen: NAD  Abd: soft, NT, ND, fundus firm below umbilicus  Lochia: moderate  Ext: no tenderness    Labs:                        11.6   15.30 )-----------( 336      ( 2021 03:57 )             35.2       Rh Interpretation: Positive ( @ 03:43)    Rh Interpretation: Positive ( @ 03:43)    Antibody Screen Negative      A: 36y PPD#1 s/p  doing well.     Plan: Continue routine postpartum care.             Anticipate d/c home.

## 2021-11-06 NOTE — OB PROVIDER H&P - LABOR: BISHOP SCORE
11
It is very important that you take your medications exactly as prescribed and never start or stop taking medicines like Prednisone without your doctor telling you to do that. It can be very dangerous to take the medications the wrong way or stop them suddenly.   It is not clear what is causing your symptoms today. Your TSH was normal and your head CT was normal. You should go back to the eye doctor and allow them to dilate your eyes. You should also arrange to see an endocrinologist (thyroid specialist) as soon as possible and also see your regular GP and neurologist about all this. If you suddenly lose vision, have trouble speaking, moving, or feeling part of your body, or have severe pain, come back to the ER.

## 2022-07-12 NOTE — OB RN PATIENT PROFILE - NS_PRENATALLABSOURCEGBS36_OBGYN_ALL_OB
Reviewed results and plan with mother.  She will continue with current med plan and we got follow up for 8/12 at 10:20 with Dr Diana Quiroz hard copy, drawn during this pregnancy

## 2022-08-31 NOTE — DISCHARGE NOTE PROVIDER - YES NO FOR MLM POSITIVE OR NEGATIVE COVID RESULT
. Melolabial Interpolation Flap Text: A decision was made to reconstruct the defect utilizing an interpolation axial flap and a staged reconstruction.  A telfa template was made of the defect.  This telfa template was then used to outline the melolabial interpolation flap.  The donor area for the pedicle flap was then injected with anesthesia.  The flap was excised through the skin and subcutaneous tissue down to the layer of the underlying musculature.  The pedicle flap was carefully excised within this deep plane to maintain its blood supply.  The edges of the donor site were undermined.   The donor site was closed in a primary fashion.  The pedicle was then rotated into position and sutured.  Once the tube was sutured into place, adequate blood supply was confirmed with blanching and refill.  The pedicle was then wrapped with xeroform gauze and dressed appropriately with a telfa and gauze bandage to ensure continued blood supply and protect the attached pedicle.

## 2023-03-20 ENCOUNTER — EMERGENCY (EMERGENCY)
Facility: HOSPITAL | Age: 38
LOS: 1 days | Discharge: HOME | End: 2023-03-20
Attending: EMERGENCY MEDICINE | Admitting: EMERGENCY MEDICINE
Payer: OTHER MISCELLANEOUS

## 2023-03-20 VITALS
DIASTOLIC BLOOD PRESSURE: 74 MMHG | RESPIRATION RATE: 16 BRPM | SYSTOLIC BLOOD PRESSURE: 117 MMHG | OXYGEN SATURATION: 100 % | TEMPERATURE: 99 F | HEART RATE: 100 BPM

## 2023-03-20 PROCEDURE — 99284 EMERGENCY DEPT VISIT MOD MDM: CPT

## 2023-03-20 RX ORDER — LIDOCAINE 4 G/100G
1 CREAM TOPICAL ONCE
Refills: 0 | Status: COMPLETED | OUTPATIENT
Start: 2023-03-20 | End: 2023-03-20

## 2023-03-20 RX ORDER — LIDOCAINE 4 G/100G
1 CREAM TOPICAL
Qty: 9 | Refills: 0
Start: 2023-03-20 | End: 2023-03-22

## 2023-03-20 RX ORDER — BUPIVACAINE HCL/PF 7.5 MG/ML
5 VIAL (ML) INJECTION ONCE
Refills: 0 | Status: DISCONTINUED | OUTPATIENT
Start: 2023-03-20 | End: 2023-03-20

## 2023-03-20 RX ORDER — BUPIVACAINE HCL/PF 7.5 MG/ML
5 VIAL (ML) INJECTION ONCE
Refills: 0 | Status: COMPLETED | OUTPATIENT
Start: 2023-03-20 | End: 2023-03-20

## 2023-03-20 RX ORDER — ACETAMINOPHEN 500 MG
650 TABLET ORAL ONCE
Refills: 0 | Status: COMPLETED | OUTPATIENT
Start: 2023-03-20 | End: 2023-03-20

## 2023-03-20 RX ADMIN — Medication 5 MILLILITER(S): at 11:54

## 2023-03-20 RX ADMIN — LIDOCAINE 1 PATCH: 4 CREAM TOPICAL at 09:29

## 2023-03-20 RX ADMIN — Medication 650 MILLIGRAM(S): at 09:29

## 2023-03-20 NOTE — ED PROVIDER NOTE - OBJECTIVE STATEMENT
37-year-old G2, P1 female presents with low back pain.  Confirmed IUP with bilateral lower back pain rating down, no red flags include no unsteady gait, neg ataxia, neg paresthesia.

## 2023-03-20 NOTE — ED PROVIDER NOTE - MUSCULOSKELETAL, MLM
Spine appears normal, range of motion is not limited, +left paraspinal muscle neg, +joint tenderness

## 2023-03-20 NOTE — ED PROVIDER NOTE - PATIENT PORTAL LINK FT
You can access the FollowMyHealth Patient Portal offered by [facility] by registering at the following website: [email or web address]. By joining [facility]’s FollowMyHealth portal, you will also be able to view your health information using other applications (apps) compatible with our system.

## 2023-03-20 NOTE — ED ADULT NURSE NOTE - OBJECTIVE STATEMENT
pt received intake spot 12. pt is 9 weeks pregnant,  A+Ox3, c/o right lower back pain x 3 days. pt denies injury. respirations even and unlabored. vss. medicated as ordered. will monitor.

## 2023-03-20 NOTE — ED PROVIDER NOTE - CLINICAL SUMMARY MEDICAL DECISION MAKING FREE TEXT BOX
Well-appearing no red flags presents with right lower paraspinal pain with no red flags no paresthesias.  Patient hemodynamically stable otherwise has confirmed IUP but too early for fetal heart rate.

## 2023-03-20 NOTE — ED ADULT TRIAGE NOTE - BP NONINVASIVE SYSTOLIC (MM HG)
Routing refill request to provider for review/approval because:  Labs not current:  RERE Pierce RN   117

## 2023-03-20 NOTE — ED ADULT TRIAGE NOTE - CHIEF COMPLAINT QUOTE
Pt. c/o lower back pain radiating into hips since Saturday. No known injury. Pt. 9w5d pregnant (GIANNA 10/18). Denies any OB complaints.

## 2023-04-13 ENCOUNTER — APPOINTMENT (OUTPATIENT)
Dept: ANTEPARTUM | Facility: CLINIC | Age: 38
End: 2023-04-13
Payer: COMMERCIAL

## 2023-04-13 ENCOUNTER — APPOINTMENT (OUTPATIENT)
Dept: OBGYN | Facility: CLINIC | Age: 38
End: 2023-04-13
Payer: COMMERCIAL

## 2023-04-13 VITALS
SYSTOLIC BLOOD PRESSURE: 130 MMHG | BODY MASS INDEX: 32.99 KG/M2 | WEIGHT: 198 LBS | DIASTOLIC BLOOD PRESSURE: 86 MMHG | HEIGHT: 65 IN

## 2023-04-13 DIAGNOSIS — O09.521 SUPERVISION OF ELDERLY MULTIGRAVIDA, FIRST TRIMESTER: ICD-10-CM

## 2023-04-13 PROCEDURE — 76813 OB US NUCHAL MEAS 1 GEST: CPT

## 2023-04-13 PROCEDURE — 36415 COLL VENOUS BLD VENIPUNCTURE: CPT

## 2023-04-13 PROCEDURE — 99213 OFFICE O/P EST LOW 20 MIN: CPT

## 2023-04-13 PROCEDURE — 76801 OB US < 14 WKS SINGLE FETUS: CPT

## 2023-04-13 NOTE — PHYSICAL EXAM
[ENT Examination] : normal [Retina] : normal [Teeth] : normal [Thyroid] : normal [Examination Of The Breasts] : normal [Examination Of The Lungs] : normal [Examination Of The Cardiovascular System] : normal [Examination Of The Abdomen] : normal [Peripheral Vascular Exam] : normal [Examination Of The Skin] : normal [Examination Of The Lymph Nodes] : normal [Average] : average [Concave] : concave [Normal] : normal [Yes] : gynecoid pelvis

## 2023-04-17 NOTE — DISCUSSION/SUMMARY
[FreeTextEntry1] : The significance of nuchal translucency testing was explained to the patient and ultrasound was performed. The sensitivity of the test can be improved by combining with second trimester quad screening. This type of sequential testing minimally increases the false positive rate, but the detection rate for Down syndrome is increased. If the sequential testing is desired, a second stage quad should be drawn and sent. As an alternative, this can be done in our office. If the patient does not desire sequential testing, then a single marker for AFP may be sent to any lab after 15 completed weeks gestation.\par \par The patient was informed that every couple in the population has approximately a 3% risk of having a child with a birth defect undetectable by prenatal diagnosis. The risk is independent of the couple's age, medical and family histories, and exposure to medications during pregnancy.\par \par The amniocentesis procedure was described as a test that can detect chromosome abnormalities with greater than 99% accuracy. This test can also detect open neural tube defects (open spina bifida and anencephaly) and ventral wall defects with approximately 95% accuracy. Closed neural tube defects are not detected by amniocentesis. The patient was informed of a risk of 1/200 or a half of one percent for the possible loss of the pregnancy due to the procedure. The patient understands the benefits, limitations and risks involved in connection with the amniocentesis.\par \par Patient has elected to decline the procedure at this time.\par \par Prenatal diagnostic testing is clinically indicated for this patient. The limitations of NIPT testing were discussed with the patient and amniocentesis was noted to remain the gold standard for prenatal diagnostic testing. The significance of NIPT testing was reviewed and offered to the patient and she has agreed to testing. Blood was drawn and the results will be sent to your office in approximately 2 weeks. Sequential screening is recommended between 15-16 weeks. Anatomy scan is recommended at 19-20 weeks. \par

## 2023-04-17 NOTE — HISTORY OF PRESENT ILLNESS
[FreeTextEntry1] : Patient is a 37 year old female presenting at 13w1d for  counseling because of advanced maternal age. GIANNA 10/18/2023. Her age related risk for chromosomal abnormalities is 1:80. The patient reports an unremarkable maternal and paternal family history and her pregnancy history is also noted to be unremarkable.\par \par The patient was informed that every couple in the population has approximately a 3% risk of having a child with a birth defect undetectable by prenatal diagnosis. The risk is independent of the couple's age, medical and family histories, and exposure to medications during pregnancy.\par \par \par \par CRL: 69.1\par NT:  1.3\par \par \par

## 2023-04-20 LAB
ADDITIONAL US: NORMAL
CHROMOSOME13 INTERPRETATION: NORMAL
CHROMOSOME13 TEST RESULT: NORMAL
CHROMOSOME18 INTERPRETATION: NORMAL
CHROMOSOME18 TEST RESULT: NORMAL
CHROMOSOME21 INTERPRETATION: NORMAL
CHROMOSOME21 TEST RESULT: NORMAL
CRL SCAN TWIN B: NORMAL
CRL SCAN: NORMAL
CROWN RUMP LENGTH TWIN B: NORMAL
CROWN RUMP LENGTH: 69.1 MM
DIA MOM: 1.77
DIA VALUE: 316.5 PG/ML
DOWN SYNDROME AGE RISK: NORMAL
DOWN SYNDROME INTERPRETATION: NORMAL
DOWN SYNDROME SCREENING RISK: NORMAL
FETAL FRACTION: NORMAL
FIRST TRIMESTER SCREEN COMMENTS: NORMAL
FIRST TRIMESTER SCREEN NOTE: NORMAL
FIRST TRIMESTER SCREEN RESULTS: NORMAL
FIRST TRIMESTER SCREEN TEST RESULTS: NORMAL
GEST. AGE ON COLLECTION DATE: 13 WEEKS
HCG MOM: 1.57
HCG VALUE: 119.2 IU/ML
MATERNAL AGE AT EDD: 38.3 YR
NT MOM TWIN B: NORMAL
NT TWIN B: NORMAL
NUCHAL TRANSLUCENCY (NT): 1.3 MM
NUCHAL TRANSLUCENCY MOM: 0.73
NUMBER OF FETUSES: 1
PAPP-A MOM: 1.06
PAPP-A VALUE: 925.3 NG/ML
PERFORMANCE AND LIMITATIONS: NORMAL
RACE: NORMAL
SEX CHROMOSOME INTERPRETATION: NORMAL
SEX CHROMOSOME TEST RESULT: NORMAL
SONOGRAPHER ID#: NORMAL
TRISOMY 18 AGE RISK: NORMAL
TRISOMY 18 INTERPRETATION: NORMAL
TRISOMY 18 SCREENING RISK: NORMAL
VERIFI PRENATAL TEST: NOT DETECTED
WEIGHT AFP: 198 LBS

## 2023-05-04 NOTE — OB NEONATOLOGY/PEDIATRICIAN DELIVERY SUMMARY - BABY A: APGAR 5 MIN COLOR, DELIVERY
Protocol For Photochemotherapy For Severe Photoresponsive Dermatoses: Tar And Nbuvb (Goeckerman Treatment): The patient received Photochemotherapy for severe photoresponsive dermatoses: Tar and NBUVB (Goeckerman treatment) requiring at least 4 to 8 hours of care under direct physician supervision. (1) body pink, extremities blue

## 2023-06-07 ENCOUNTER — NON-APPOINTMENT (OUTPATIENT)
Age: 38
End: 2023-06-07

## 2023-06-08 ENCOUNTER — APPOINTMENT (OUTPATIENT)
Dept: OBGYN | Facility: CLINIC | Age: 38
End: 2023-06-08
Payer: COMMERCIAL

## 2023-06-08 ENCOUNTER — APPOINTMENT (OUTPATIENT)
Dept: ANTEPARTUM | Facility: CLINIC | Age: 38
End: 2023-06-08
Payer: COMMERCIAL

## 2023-06-08 VITALS — WEIGHT: 202 LBS | BODY MASS INDEX: 33.61 KG/M2 | DIASTOLIC BLOOD PRESSURE: 74 MMHG | SYSTOLIC BLOOD PRESSURE: 114 MMHG

## 2023-06-08 VITALS
HEIGHT: 65 IN | BODY MASS INDEX: 33.32 KG/M2 | DIASTOLIC BLOOD PRESSURE: 82 MMHG | WEIGHT: 200 LBS | SYSTOLIC BLOOD PRESSURE: 131 MMHG

## 2023-06-08 PROCEDURE — 76811 OB US DETAILED SNGL FETUS: CPT

## 2023-06-08 PROCEDURE — 76817 TRANSVAGINAL US OBSTETRIC: CPT | Mod: 59

## 2023-06-08 PROCEDURE — ZZZZZ: CPT

## 2023-06-16 ENCOUNTER — APPOINTMENT (OUTPATIENT)
Dept: PEDIATRIC CARDIOLOGY | Facility: CLINIC | Age: 38
End: 2023-06-16
Payer: COMMERCIAL

## 2023-06-16 PROCEDURE — 76825 ECHO EXAM OF FETAL HEART: CPT

## 2023-06-16 PROCEDURE — 76827 ECHO EXAM OF FETAL HEART: CPT

## 2023-06-16 PROCEDURE — 76821 MIDDLE CEREBRAL ARTERY ECHO: CPT

## 2023-06-16 PROCEDURE — 93325 DOPPLER ECHO COLOR FLOW MAPG: CPT | Mod: 59

## 2023-06-16 PROCEDURE — 76820 UMBILICAL ARTERY ECHO: CPT

## 2023-06-16 PROCEDURE — 99203 OFFICE O/P NEW LOW 30 MIN: CPT | Mod: 25

## 2023-08-01 ENCOUNTER — APPOINTMENT (OUTPATIENT)
Dept: ANTEPARTUM | Facility: CLINIC | Age: 38
End: 2023-08-01
Payer: COMMERCIAL

## 2023-08-01 ENCOUNTER — APPOINTMENT (OUTPATIENT)
Dept: OBGYN | Facility: CLINIC | Age: 38
End: 2023-08-01
Payer: COMMERCIAL

## 2023-08-01 VITALS — BODY MASS INDEX: 32.95 KG/M2 | WEIGHT: 198 LBS | DIASTOLIC BLOOD PRESSURE: 76 MMHG | SYSTOLIC BLOOD PRESSURE: 118 MMHG

## 2023-08-01 PROCEDURE — 76819 FETAL BIOPHYS PROFIL W/O NST: CPT | Mod: 59

## 2023-08-01 PROCEDURE — 76817 TRANSVAGINAL US OBSTETRIC: CPT | Mod: 59

## 2023-08-01 PROCEDURE — 76816 OB US FOLLOW-UP PER FETUS: CPT

## 2023-08-01 PROCEDURE — 0502F SUBSEQUENT PRENATAL CARE: CPT

## 2023-08-21 NOTE — OB SUMMARY
[Date: _____] : Date: [unfilled]  [Gestational Age: _____] : Gestational Age: [unfilled]  [FreeTextEntry2] : Savana GIANNA: 10/18/2023 [FreeTextEntry1] : PT presents today for her Anatomy Scan. Normal fluid. Normal movement. Normal growth. ( see official report)\par - FHR POS\par - pt has fetal echo scheduled this week\par - f/u as needed [de-identified] : dn [FreeTextEntry9] : pt presents to office for placenta check/efw.  Sono done today reveals previa has resolved. Normal growth - Wt 2lbs 9oz. Normal fluid. Normal movement. +FHR(see official sono report). Cervical length measuring 1.6cm, discussed risks of premature labor. Labor precautions given. She c/o unable to travel to work as she works in [employer] and lives in [place]. She has to take 2 trains and 1 bus. Advised her to speak to her employer to accomodate her  [de-identified] : konstantin

## 2023-09-21 ENCOUNTER — INPATIENT (INPATIENT)
Facility: HOSPITAL | Age: 38
LOS: 1 days | Discharge: HOME | End: 2023-09-23
Attending: OBSTETRICS & GYNECOLOGY | Admitting: OBSTETRICS & GYNECOLOGY

## 2023-09-21 ENCOUNTER — TRANSCRIPTION ENCOUNTER (OUTPATIENT)
Age: 38
End: 2023-09-21

## 2023-09-21 ENCOUNTER — APPOINTMENT (OUTPATIENT)
Dept: ANTEPARTUM | Facility: CLINIC | Age: 38
End: 2023-09-21

## 2023-09-21 VITALS
DIASTOLIC BLOOD PRESSURE: 69 MMHG | RESPIRATION RATE: 16 BRPM | HEART RATE: 105 BPM | SYSTOLIC BLOOD PRESSURE: 105 MMHG | TEMPERATURE: 98 F

## 2023-09-21 DIAGNOSIS — O26.899 OTHER SPECIFIED PREGNANCY RELATED CONDITIONS, UNSPECIFIED TRIMESTER: ICD-10-CM

## 2023-09-21 LAB
APTT BLD: 29.6 SEC — SIGNIFICANT CHANGE UP (ref 24.5–35.6)
BASOPHILS # BLD AUTO: 0.03 K/UL — SIGNIFICANT CHANGE UP (ref 0–0.2)
BASOPHILS # BLD AUTO: 0.04 K/UL — SIGNIFICANT CHANGE UP (ref 0–0.2)
BASOPHILS NFR BLD AUTO: 0.2 % — SIGNIFICANT CHANGE UP (ref 0–2)
BASOPHILS NFR BLD AUTO: 0.3 % — SIGNIFICANT CHANGE UP (ref 0–2)
BLD GP AB SCN SERPL QL: NEGATIVE — SIGNIFICANT CHANGE UP
EOSINOPHIL # BLD AUTO: 0.14 K/UL — SIGNIFICANT CHANGE UP (ref 0–0.5)
EOSINOPHIL # BLD AUTO: 0.22 K/UL — SIGNIFICANT CHANGE UP (ref 0–0.5)
EOSINOPHIL NFR BLD AUTO: 0.8 % — SIGNIFICANT CHANGE UP (ref 0–6)
EOSINOPHIL NFR BLD AUTO: 2.1 % — SIGNIFICANT CHANGE UP (ref 0–6)
FIBRINOGEN PPP-MCNC: 383 MG/DL — SIGNIFICANT CHANGE UP (ref 200–465)
HCT VFR BLD CALC: 29.8 % — LOW (ref 34.5–45)
HCT VFR BLD CALC: 32.1 % — LOW (ref 34.5–45)
HGB BLD-MCNC: 10.4 G/DL — LOW (ref 11.5–15.5)
HGB BLD-MCNC: 9.8 G/DL — LOW (ref 11.5–15.5)
IANC: 14.87 K/UL — HIGH (ref 1.8–7.4)
IANC: 7.4 K/UL — SIGNIFICANT CHANGE UP (ref 1.8–7.4)
IMM GRANULOCYTES NFR BLD AUTO: 1 % — HIGH (ref 0–0.9)
IMM GRANULOCYTES NFR BLD AUTO: 1.5 % — HIGH (ref 0–0.9)
INR BLD: 1 RATIO — SIGNIFICANT CHANGE UP (ref 0.85–1.18)
LACTATE SERPL-SCNC: 21.2 MMOL/L — CRITICAL HIGH (ref 0.5–2)
LYMPHOCYTES # BLD AUTO: 1.71 K/UL — SIGNIFICANT CHANGE UP (ref 1–3.3)
LYMPHOCYTES # BLD AUTO: 1.75 K/UL — SIGNIFICANT CHANGE UP (ref 1–3.3)
LYMPHOCYTES # BLD AUTO: 16.3 % — SIGNIFICANT CHANGE UP (ref 13–44)
LYMPHOCYTES # BLD AUTO: 9.3 % — LOW (ref 13–44)
MCHC RBC-ENTMCNC: 26.9 PG — LOW (ref 27–34)
MCHC RBC-ENTMCNC: 27.1 PG — SIGNIFICANT CHANGE UP (ref 27–34)
MCHC RBC-ENTMCNC: 32.4 GM/DL — SIGNIFICANT CHANGE UP (ref 32–36)
MCHC RBC-ENTMCNC: 32.9 GM/DL — SIGNIFICANT CHANGE UP (ref 32–36)
MCV RBC AUTO: 82.3 FL — SIGNIFICANT CHANGE UP (ref 80–100)
MCV RBC AUTO: 82.9 FL — SIGNIFICANT CHANGE UP (ref 80–100)
MONOCYTES # BLD AUTO: 1.15 K/UL — HIGH (ref 0–0.9)
MONOCYTES # BLD AUTO: 1.34 K/UL — HIGH (ref 0–0.9)
MONOCYTES NFR BLD AUTO: 10.7 % — SIGNIFICANT CHANGE UP (ref 2–14)
MONOCYTES NFR BLD AUTO: 7.3 % — SIGNIFICANT CHANGE UP (ref 2–14)
NEUTROPHILS # BLD AUTO: 14.87 K/UL — HIGH (ref 1.8–7.4)
NEUTROPHILS # BLD AUTO: 7.4 K/UL — SIGNIFICANT CHANGE UP (ref 1.8–7.4)
NEUTROPHILS NFR BLD AUTO: 69.1 % — SIGNIFICANT CHANGE UP (ref 43–77)
NEUTROPHILS NFR BLD AUTO: 81.4 % — HIGH (ref 43–77)
NRBC # BLD: 0 /100 WBCS — SIGNIFICANT CHANGE UP (ref 0–0)
NRBC # BLD: 0 /100 WBCS — SIGNIFICANT CHANGE UP (ref 0–0)
NRBC # FLD: 0 K/UL — SIGNIFICANT CHANGE UP (ref 0–0)
NRBC # FLD: 0 K/UL — SIGNIFICANT CHANGE UP (ref 0–0)
PLATELET # BLD AUTO: 305 K/UL — SIGNIFICANT CHANGE UP (ref 150–400)
PLATELET # BLD AUTO: 329 K/UL — SIGNIFICANT CHANGE UP (ref 150–400)
PROTHROM AB SERPL-ACNC: 11.3 SEC — SIGNIFICANT CHANGE UP (ref 9.5–13)
RBC # BLD: 3.62 M/UL — LOW (ref 3.8–5.2)
RBC # BLD: 3.87 M/UL — SIGNIFICANT CHANGE UP (ref 3.8–5.2)
RBC # FLD: 15.9 % — HIGH (ref 10.3–14.5)
RBC # FLD: 15.9 % — HIGH (ref 10.3–14.5)
RH IG SCN BLD-IMP: POSITIVE — SIGNIFICANT CHANGE UP
WBC # BLD: 10.71 K/UL — HIGH (ref 3.8–10.5)
WBC # BLD: 18.29 K/UL — HIGH (ref 3.8–10.5)
WBC # FLD AUTO: 10.71 K/UL — HIGH (ref 3.8–10.5)
WBC # FLD AUTO: 18.29 K/UL — HIGH (ref 3.8–10.5)

## 2023-09-21 RX ORDER — AER TRAVELER 0.5 G/1
1 SOLUTION RECTAL; TOPICAL EVERY 4 HOURS
Refills: 0 | Status: DISCONTINUED | OUTPATIENT
Start: 2023-09-21 | End: 2023-09-23

## 2023-09-21 RX ORDER — LANOLIN
1 OINTMENT (GRAM) TOPICAL EVERY 6 HOURS
Refills: 0 | Status: DISCONTINUED | OUTPATIENT
Start: 2023-09-21 | End: 2023-09-23

## 2023-09-21 RX ORDER — OXYCODONE HYDROCHLORIDE 5 MG/1
5 TABLET ORAL
Refills: 0 | Status: DISCONTINUED | OUTPATIENT
Start: 2023-09-21 | End: 2023-09-23

## 2023-09-21 RX ORDER — CEFAZOLIN SODIUM 1 G
VIAL (EA) INJECTION
Refills: 0 | Status: COMPLETED | OUTPATIENT
Start: 2023-09-22 | End: 2023-09-22

## 2023-09-21 RX ORDER — SODIUM CHLORIDE 9 MG/ML
1000 INJECTION, SOLUTION INTRAVENOUS ONCE
Refills: 0 | Status: COMPLETED | OUTPATIENT
Start: 2023-09-21 | End: 2023-09-21

## 2023-09-21 RX ORDER — SODIUM CHLORIDE 9 MG/ML
3 INJECTION INTRAMUSCULAR; INTRAVENOUS; SUBCUTANEOUS EVERY 8 HOURS
Refills: 0 | Status: DISCONTINUED | OUTPATIENT
Start: 2023-09-21 | End: 2023-09-23

## 2023-09-21 RX ORDER — AMPICILLIN TRIHYDRATE 250 MG
1 CAPSULE ORAL EVERY 4 HOURS
Refills: 0 | Status: DISCONTINUED | OUTPATIENT
Start: 2023-09-21 | End: 2023-09-22

## 2023-09-21 RX ORDER — TETANUS TOXOID, REDUCED DIPHTHERIA TOXOID AND ACELLULAR PERTUSSIS VACCINE, ADSORBED 5; 2.5; 8; 8; 2.5 [IU]/.5ML; [IU]/.5ML; UG/.5ML; UG/.5ML; UG/.5ML
0.5 SUSPENSION INTRAMUSCULAR ONCE
Refills: 0 | Status: COMPLETED | OUTPATIENT
Start: 2023-09-21

## 2023-09-21 RX ORDER — OXYTOCIN 10 UNIT/ML
333.33 VIAL (ML) INJECTION
Qty: 20 | Refills: 0 | Status: DISCONTINUED | OUTPATIENT
Start: 2023-09-21 | End: 2023-09-22

## 2023-09-21 RX ORDER — ACETAMINOPHEN 500 MG
975 TABLET ORAL
Refills: 0 | Status: DISCONTINUED | OUTPATIENT
Start: 2023-09-21 | End: 2023-09-23

## 2023-09-21 RX ORDER — CITRIC ACID/SODIUM CITRATE 300-500 MG
15 SOLUTION, ORAL ORAL EVERY 6 HOURS
Refills: 0 | Status: DISCONTINUED | OUTPATIENT
Start: 2023-09-21 | End: 2023-09-22

## 2023-09-21 RX ORDER — OXYCODONE HYDROCHLORIDE 5 MG/1
5 TABLET ORAL ONCE
Refills: 0 | Status: DISCONTINUED | OUTPATIENT
Start: 2023-09-21 | End: 2023-09-23

## 2023-09-21 RX ORDER — IBUPROFEN 200 MG
600 TABLET ORAL EVERY 6 HOURS
Refills: 0 | Status: COMPLETED | OUTPATIENT
Start: 2023-09-21 | End: 2024-08-19

## 2023-09-21 RX ORDER — BENZOCAINE 10 %
1 GEL (GRAM) MUCOUS MEMBRANE EVERY 6 HOURS
Refills: 0 | Status: DISCONTINUED | OUTPATIENT
Start: 2023-09-21 | End: 2023-09-23

## 2023-09-21 RX ORDER — SIMETHICONE 80 MG/1
80 TABLET, CHEWABLE ORAL EVERY 4 HOURS
Refills: 0 | Status: DISCONTINUED | OUTPATIENT
Start: 2023-09-21 | End: 2023-09-23

## 2023-09-21 RX ORDER — DIPHENHYDRAMINE HCL 50 MG
25 CAPSULE ORAL EVERY 6 HOURS
Refills: 0 | Status: DISCONTINUED | OUTPATIENT
Start: 2023-09-21 | End: 2023-09-23

## 2023-09-21 RX ORDER — PRAMOXINE HYDROCHLORIDE 150 MG/15G
1 AEROSOL, FOAM RECTAL EVERY 4 HOURS
Refills: 0 | Status: DISCONTINUED | OUTPATIENT
Start: 2023-09-21 | End: 2023-09-23

## 2023-09-21 RX ORDER — CHLORHEXIDINE GLUCONATE 213 G/1000ML
1 SOLUTION TOPICAL DAILY
Refills: 0 | Status: DISCONTINUED | OUTPATIENT
Start: 2023-09-21 | End: 2023-09-22

## 2023-09-21 RX ORDER — CEFAZOLIN SODIUM 1 G
1000 VIAL (EA) INJECTION EVERY 8 HOURS
Refills: 0 | Status: COMPLETED | OUTPATIENT
Start: 2023-09-22 | End: 2023-09-22

## 2023-09-21 RX ORDER — KETOROLAC TROMETHAMINE 30 MG/ML
30 SYRINGE (ML) INJECTION ONCE
Refills: 0 | Status: DISCONTINUED | OUTPATIENT
Start: 2023-09-21 | End: 2023-09-21

## 2023-09-21 RX ORDER — MAGNESIUM HYDROXIDE 400 MG/1
30 TABLET, CHEWABLE ORAL
Refills: 0 | Status: DISCONTINUED | OUTPATIENT
Start: 2023-09-21 | End: 2023-09-23

## 2023-09-21 RX ORDER — MORPHINE SULFATE 50 MG/1
2 CAPSULE, EXTENDED RELEASE ORAL ONCE
Refills: 0 | Status: DISCONTINUED | OUTPATIENT
Start: 2023-09-21 | End: 2023-09-21

## 2023-09-21 RX ORDER — CEFAZOLIN SODIUM 1 G
1000 VIAL (EA) INJECTION ONCE
Refills: 0 | Status: COMPLETED | OUTPATIENT
Start: 2023-09-21 | End: 2023-09-22

## 2023-09-21 RX ORDER — AMPICILLIN TRIHYDRATE 250 MG
2 CAPSULE ORAL ONCE
Refills: 0 | Status: COMPLETED | OUTPATIENT
Start: 2023-09-21 | End: 2023-09-21

## 2023-09-21 RX ORDER — HYDROCORTISONE 1 %
1 OINTMENT (GRAM) TOPICAL EVERY 6 HOURS
Refills: 0 | Status: DISCONTINUED | OUTPATIENT
Start: 2023-09-21 | End: 2023-09-23

## 2023-09-21 RX ORDER — OXYTOCIN 10 UNIT/ML
41.67 VIAL (ML) INJECTION
Qty: 20 | Refills: 0 | Status: DISCONTINUED | OUTPATIENT
Start: 2023-09-21 | End: 2023-09-22

## 2023-09-21 RX ORDER — INFLUENZA VIRUS VACCINE 15; 15; 15; 15 UG/.5ML; UG/.5ML; UG/.5ML; UG/.5ML
0.5 SUSPENSION INTRAMUSCULAR ONCE
Refills: 0 | Status: DISCONTINUED | OUTPATIENT
Start: 2023-09-21 | End: 2023-09-23

## 2023-09-21 RX ORDER — DIBUCAINE 1 %
1 OINTMENT (GRAM) RECTAL EVERY 6 HOURS
Refills: 0 | Status: DISCONTINUED | OUTPATIENT
Start: 2023-09-21 | End: 2023-09-23

## 2023-09-21 RX ORDER — SODIUM CHLORIDE 9 MG/ML
1000 INJECTION, SOLUTION INTRAVENOUS
Refills: 0 | Status: DISCONTINUED | OUTPATIENT
Start: 2023-09-21 | End: 2023-09-22

## 2023-09-21 RX ORDER — OXYTOCIN 10 UNIT/ML
VIAL (ML) INJECTION
Qty: 30 | Refills: 0 | Status: DISCONTINUED | OUTPATIENT
Start: 2023-09-21 | End: 2023-09-22

## 2023-09-21 RX ADMIN — CHLORHEXIDINE GLUCONATE 1 APPLICATION(S): 213 SOLUTION TOPICAL at 14:15

## 2023-09-21 RX ADMIN — SODIUM CHLORIDE 125 MILLILITER(S): 9 INJECTION, SOLUTION INTRAVENOUS at 12:54

## 2023-09-21 RX ADMIN — Medication 108 GRAM(S): at 16:57

## 2023-09-21 RX ADMIN — SODIUM CHLORIDE 3 MILLILITER(S): 9 INJECTION INTRAMUSCULAR; INTRAVENOUS; SUBCUTANEOUS at 22:00

## 2023-09-21 RX ADMIN — Medication 200 GRAM(S): at 12:54

## 2023-09-21 RX ADMIN — Medication 30 MILLIGRAM(S): at 19:18

## 2023-09-21 RX ADMIN — MORPHINE SULFATE 2 MILLIGRAM(S): 50 CAPSULE, EXTENDED RELEASE ORAL at 20:47

## 2023-09-21 RX ADMIN — Medication 2 MILLIUNIT(S)/MIN: at 13:15

## 2023-09-21 RX ADMIN — SODIUM CHLORIDE 1000 MILLILITER(S): 9 INJECTION, SOLUTION INTRAVENOUS at 20:50

## 2023-09-21 RX ADMIN — Medication 30 MILLIGRAM(S): at 18:49

## 2023-09-21 RX ADMIN — Medication 0.2 MILLIGRAM(S): at 20:22

## 2023-09-21 RX ADMIN — MORPHINE SULFATE 2 MILLIGRAM(S): 50 CAPSULE, EXTENDED RELEASE ORAL at 21:30

## 2023-09-21 NOTE — DISCHARGE NOTE OB - CARE PROVIDERS DIRECT ADDRESSES
[email or web address]

## 2023-09-21 NOTE — CHART NOTE - NSCHARTNOTEFT_GEN_A_CORE
Nona placed at around 8:40pm and then taken off suction and balloon deflated at approximately 10:45pm. Approx 20cc of dark red blood in tubing, no blood in cannister. Nona removed at approximately 11:30pm, minimal bleeding from the vagina. Patient feels well. She continues to deny feeling lightheaded, dizzy, CP, SOB, N/V. Reports that she feels better after the 2 units of blood.    T(C): 36.5 (09-21-23 @ 17:00), Max: 36.6 (09-21-23 @ 14:00)  HR: 120 (09-21-23 @ 23:51) (73 - 121)  BP: 117/66 (09-21-23 @ 23:51) (56/30 - 126/56)  RR: 17 (09-21-23 @ 17:00) (16 - 17)  SpO2: 97% (09-21-23 @ 23:47) (91% - 100%)    Lactate: 21.2                          9.8    18.29 )-----------( 305      ( 21 Sep 2023 20:40 )             29.8                         10.4   10.71 )-----------( 329      ( 21 Sep 2023 12:20 )             32.1       Gen: NAD  Abd: Fundus firm at umbilicus, midline  : minimal bleeding with firm fundal pressure  Ext: warm to touch    - f/u stat CBC and lactate  - s/p NONA (removed 11:30pm) with minimal bleeding  - s/p 2u PRBC, methergine IM, rectal cytotec, TXA  - c/w Ancef for total 3 doses  - Mild tachycardia now to 100s    d/w Dr. Balbir Sepulveda, PGY3 Nona placed at around 8:40pm and then taken off suction and balloon deflated at approximately 10:45pm. Approx 20cc of dark red blood in tubing, no blood in cannister. Nona removed at approximately 11:30pm, minimal bleeding from the vagina. Patient feels well. She continues to deny feeling lightheaded, dizzy, CP, SOB, N/V. Reports that she feels better after the 2 units of blood.    T(C): 36.5 (09-21-23 @ 17:00), Max: 36.6 (09-21-23 @ 14:00)  HR: 120 (09-21-23 @ 23:51) (73 - 121)  BP: 117/66 (09-21-23 @ 23:51) (56/30 - 126/56)  RR: 17 (09-21-23 @ 17:00) (16 - 17)  SpO2: 97% (09-21-23 @ 23:47) (91% - 100%)    Lactate: 21.2                          9.8    18.29 )-----------( 305      ( 21 Sep 2023 20:40 )             29.8                         10.4   10.71 )-----------( 329      ( 21 Sep 2023 12:20 )             32.1       Gen: NAD  Abd: Fundus firm at umbilicus, midline  : minimal bleeding with firm fundal pressure  Ext: warm to touch    - f/u stat CBC and lactate  - s/p NONA (removed 11:30pm) with minimal bleeding  - s/p 2u PRBC, methergine IM, rectal cytotec, TXA  - c/w Ancef for total 3 doses  - Mild tachycardia now to 100s    d/w Dr. Balbir Sepulveda, PGY3    **Update**                          10.1   17.07 )-----------( 261      ( 22 Sep 2023 00:15 )             31.1       lactate: 2.0    H/H stable, lactate wnl now at 2.0    - Encourage po hydration  - VSS, ctm    LEANDRA Sepulveda, PGY3

## 2023-09-21 NOTE — CHART NOTE - NSCHARTNOTEFT_GEN_A_CORE
PPH PPH (delayed entry due to clinical duties)    - Called by nursing to bedside for evaluation of PP bleeding at approximately 8PM     On bedside evaluation, approximately 1000cc of clot noted on tita. Patient reports feeling cold and fatigued. Denies any lightheadedness, dizziness, nausea, vomiting, abdominal pain or other complaints. Has urge to void.    VS:  HR 95  /73  O2 sat: 100%    Exam:  Gen: NAD  Abd: Fundus firm  : 1000cc of clot on tita, minimal bleeding noted with additional fundal check  Ext: Nontender    TAUS: Thin endometrial stripe at fundus. Clot in RENETTA identified.    A&P  41 y/o  POD#0 from uncomplicated  with  with postpartum course c/b PPH. Bimanual examination performed and additional clots evacuated. Cytotec CT and IM methergine administered. Additional clot noted in RENETTA after repeating TAUS, evacuated again. Decision made PPH (delayed entry due to clinical duties)    Called by nursing to bedside for evaluation of PP bleeding at approximately 8PM     On bedside evaluation, approximately 1000cc of clot noted on tita. Patient reports feeling cold and fatigued. Denies any lightheadedness, dizziness, nausea, vomiting, abdominal pain or other complaints. Has urge to void.    VS:  HR 95  /73  O2 sat: 100%    Exam:  Gen: NAD  Abd: Fundus firm  : 1000cc of clot on tita, minimal bleeding noted with additional fundal check  Ext: Nontender    TAUS: Thin endometrial stripe at fundus. Clot in RENETTA identified.    A&P  41 y/o  POD#0 from uncomplicated  with  with postpartum course c/b PPH. Bimanual examination performed and additional clots evacuated. Cytotec AL and IM methergine administered. Additional clot noted in RENETTA after repeating TAUS, evacuated again. Decision made to proceed with MARY. While awaiting morphine administration for pain control noted some brighter red bleeding and MARY placed under sono guidance. Minimal amount of blood in tubing. Anesthesia called to bedside and BPs rechecked, noted to be 56/30. RRT called. Anesthesia at bedside administrating IV phenylephrine pushes. Rapid response team at bedside. MTP called to facilitate release of blood from blood bank. 2u PRBC brought up from blood bank. STAT labs drawn. Bleeding well controlled with no significant MARY output. BP stabilized after administration of blood products. Pt reporting feeling cold and fatigued but denies any other complaints. Pain well controlled. Total QBL: 1967cc.  - methergine series  - ancef 24 hrs  - repeat labs in 3 hours    Please see additional note by Dr. Mcgregor for further information.    Dr. Mcgregor, Dr. Estrada at bedside.   Tracy Yadav, PGY-4

## 2023-09-21 NOTE — OB RN DELIVERY SUMMARY - NSSELHIDDEN_OBGYN_ALL_OB_FT
[NS_DeliveryAttending1_OBGYN_ALL_OB_FT:[email or web address]],[NS_DeliveryRN_OBGYN_ALL_OB_FT:[email or web address]=]

## 2023-09-21 NOTE — OB PROVIDER TRIAGE NOTE - HISTORY OF PRESENT ILLNESS
Ms. FRANCY HOWARD is a 38y  @ 36w1d p/w lof since 2a last night, clear. 3cm dilated @ last visit on Tuesday. + FM. + ctx [id number]. Denies vb. GBS unk, taken on Tuesday. EFW 2800.  PNC: Dr. Dodd. Uncomplicated.  Denies prenatal issues or complications. Pt reports no hospitalizations, procedures, infections, or new diagnoses in pregnancy. Pt denies complications with blood pressure and/or blood sugar.

## 2023-09-21 NOTE — OB RN DELIVERY SUMMARY - NS_TRUEKNOTA_OBGYN_ALL_OB
[facility], [address] [place] [address] Office Pool             Acute Visit-No Appt Available, pt needs appt today for sinus infection or  FLU had fever of 102 last night, please call pt at [phone]     Copy/Paste  ENVERA
Spoke with patient. Two pt identifiers confirmed. Patient advised that our office does not have any available appointments today. Patient advised to be seen at Urgent Care. Pt verbalized understanding of information discussed w/ no further questions at this time.
None

## 2023-09-21 NOTE — OB PROVIDER H&P - ASSESSMENT
Ms. FRANCY HOWARD is a 38y  @ 36w1d p/w lof since 2a last night, clear. 3cm dilated @ last visit on Tuesday. + FM. + ctx [id number]. Denies vb. GBS unk, taken on Tuesday. EFW 2800.  PNC: Dr. Dodd. Uncomplicated.     Plan  - Admit to Labor and Delivery for induction of labor for PROM @ 2a last night  - GBS unknown, for ampicillin   - Continuous EFM  - Clear diet, IV fluids  - Consent signed  - Standard labs (T&S, CBC, RPR)   - Induction/augmentation agent: Pitocin   - Consider re-examination in 4 hours     Informed consent was obtained. The following was discussed:  - Induction/augmentation of labor: use of medication and/or cook balloon to begin or enhance labor  - Obstetrical management including internal fetal/contraction monitoring  - Normal vaginal delivery  - Possible  section    Risks, benefits, alternatives, and possible complications have been discussed in detail with the patient in English, patient's preferred language, demonstrating understanding, all questions answered.. Pre-admission, admission, and post admission procedures and expectations were discussed in detail. All questions answered, all appropriate hospital consents were signed. Anticipate normal vaginal delivery.    Plan d/w Dr. De Souza. Dr. Solomon, PGY4 notified.

## 2023-09-21 NOTE — OB PROVIDER IHI INDUCTION/AUGMENTATION NOTE - NS_FINALEDD_OBGYN_ALL_OB_DT
"Outpatient Speech Language Pathology Progress Note     Patient: Saeid Gray  : 2017    Beginning/End Dates of Reporting Period:  20 to 2021    Referring Provider: Fidelina Phelan, PhDLP    Therapy Diagnosis: severe expressive language deficits; severe receptive language deficits    Client Self Report: SLP: Saeid has been seen 8x during this reporting period.  Mom reports that Saeid is enjoys playing at home with his siblings at home and will say phrases like \"Let's go,\" \"Come on.\" She reports that his most frequent 2-3 word utterance is \"I want __.\" Mom often participates in session activities with Saeid and clinician.    Objective Measurements: See initial evaluation 20    Goals:  Goal Identifier    Goal Description Saeid will follow a simple 1-step direction across 4/5 opportunities given moderate therapeutic supports across two treatment sessions, in order to facilitate receptive language development    Target Date 21   Date Met   Partially met, continue to follow.    Progress: Partially met, continue to follow. Saeid is following 1 step directions with moderate-max supports and requiring multiple repetitions. He has a difficult time following, even with [place] sometimes. Continue to address and anticipate goal will be met during next reporting period.     Goal Identifier    Goal Description Saeid will identify 10 pictures during literacy activity with a model and mod-max cueing, over two sessions, in order to increase receptive language skills.   Target Date 21   Date Met   MET (21)   Progress: MET (21): Saeid is identifying more than 10 pictures in books when given a model and max cueing.      Goal Identifier    Goal Description Saied will use 10 pictures/verbal approximations/signs to comment/label/request during a therapy session with model and mod-max cueing, over two sessions, in order to increase expressive language skills. "   Target Date 04/19/21   Date Met   MET (3/10/21)   Progress: MET (3/10/21): Saeid is using more than 10 words to label, request, and comment given models and mod-max verbal cues. Will add new goal to address using 2-3 word phrases to functionally request or comment independently.      Goal Identifier    Goal Description Saeid will engage in a turn-taking routine 5x with max SLP supports, in order to increase pragmatic use of language.   Target Date 04/19/21   Date Met   Partially met, continue to follow.   Progress: Partially met, continue to follow. Saeid took 2-5 turns during this reporting period. He has been variable d/t interest in activities and wanting to solely play with toys of high interest. Saeid has demonstrated the ability to spontaneously share a toy with a clinician 2x. Anticipate this goal to be met during the next reporting period.     Goal Identifier    Goal Description Saeid's caregivers will demonstrate understanding of 2 speech-language strategies given minimal supports across two treatment sessions, in order to facilitate carryover of home programming recs.    Target Date 04/19/21   Date Met  MET (4/16/21)   Progress:  MET (4/16/21): Mom reports doing strategies at home. Continue to follow.      Goal Identifier    Goal Description Saeid Will independently use 2-3 word phrases 10x during a therapy session to request/comment/label with moderate-minimal cues, over two sessions, in order to increase expressive language and functional communication.    Target Date 04/19/21   Date Met   Partially met, continue to follow.   Progress: Partially met, continue to follow. Saeid is independently producing from 8-10x 2-3 word phrases with moderate to minimal cues. He continues to mainly imitate what the clinician or his mother says or phrases he has heard. Continue to address this goal and anticipate it will be met soon during the next reporting period.       Progress Toward  Goals:    Progress this reporting period: Saeid is showing good progress toward his goals. He is now using more than 1 word to communicate and putting together 2-3 word phrases at home and in the clinic. However, these phrases are often in imitation. He met his goal for ID pictures during literacy activities. Saeid sometimes has difficulty with expressing himself when he is excited or frustrated. Saeid would continue to benefit from speech and language therapy in order to improve expressive and receptive language skills and functional communication.      Plan:  Continue therapy per current plan of care.  Anticipate goals to be met by 7/12/21    Discharge:  FELISHA London, Speech Language Pathology      18-Oct-2023

## 2023-09-21 NOTE — OB RN TRIAGE NOTE - FALL HARM RISK - UNIVERSAL INTERVENTIONS
Bed in lowest position, wheels locked, appropriate side rails in place/Call bell, personal items and telephone in reach/Instruct patient to call for assistance before getting out of bed or chair/Non-slip footwear when patient is out of bed/[place] to call system/Physically safe environment - no spills, clutter or unnecessary equipment/Purposeful Proactive Rounding/Room/bathroom lighting operational, light cord in reach

## 2023-09-21 NOTE — DISCHARGE NOTE OB - CARE PROVIDER_API CALL
Gris Dodd  Obstetrics and Gynecology  [address], [address]  [place] [address]  Phone: [phone]  Fax: [phone]  Follow Up Time:

## 2023-09-21 NOTE — OB PROVIDER H&P - NSHPPHYSICALEXAM_GEN_ALL_CORE
T(C): 36.5 (09-21-23 @ 10:42), Max: 36.5 (09-21-23 @ 10:42)  HR: 112 (09-21-23 @ 11:25) (103 - 112)  BP: 108/69 (09-21-23 @ 11:25) (105/69 - 111/64)  RR: 16 (09-21-23 @ 10:42) (16 - 16)  SpO2: --  General: Female sitting comfortably in no apparent distress.   Head: Normocephalic. Atraumatic.   Eyes: No discharge, lids normal, conjunctiva normal  Lungs: No resp distress  Abdomen: Soft, nontender. Gravid.   TAUS:  Sono saved in ASOB.   NST: Reactive. Category 1.   Neuro: No facial asymmetry, no slurred speech, moves all 4 extremities  Mood: Alert and lucid, appropriate mood and affect

## 2023-09-21 NOTE — OB RN DELIVERY SUMMARY - NS_SEPSISRSKCALC_OBGYN_ALL_OB_FT
EOS calculated successfully. EOS Risk Factor: 0.5/1000 live births ([facility] national incidence); GA=36w1d; Temp=97.88; ROM=15.5; GBS='Unknown'; Antibiotics='GBS specific antibiotics > 2 hrs prior to birth'

## 2023-09-21 NOTE — DISCHARGE NOTE OB - MATERIALS PROVIDED
Vaccinations/[facility]  Screening Program/  Immunization Record/Guide to Postpartum Care/[facility] Hearing Screen Program/Back To Sleep Handout/Shaken Baby Prevention Handout/Birth Certificate Instructions/Tdap Vaccination (VIS Pub Date: 2012)

## 2023-09-21 NOTE — CHART NOTE - NSCHARTNOTEFT_GEN_A_CORE
OB Attending Note (late note due to patient care)    Called to bedside by resident due to concern for bleeding after delivery.  Patient had uncomplicated  w/ QBL of 285.   Immediately after delivery patient had appropriate bleeding with PP pitocin running.   No lacerations were identified on exam after delivery.       Approximately 3 hours after delivery nurse notified resident that heavier bleeding was noted with fundal checks.   Resident performed fundal check and noted heavier bleeding.  I was notified and arrived shortly after at bedside.   UPon my arrival bleeding was noted to be minimal.   EBL that was lost was approximately 1000 cc.   Bedside sono showed thin EM stripe.  Methergine IM x 1 and cytotec MO had already been given.  Patient's vitals were stable with BP in normal range and normal HR.  A stat CBC and coags had been drawn and were being sent off.   Patent denied lightheadedness, dizziness, CP/SOB.   Reported fatigue from delivery.       I performed a fundal massage myself and expressed additional clot from the RENETTA.  Fundus felt firm however continued increased mild bleeding noted.  I informed resident and nursing that I would recommend proceeding with a MARY device as a precautionary measure given continued presumed mild atony.   Patient requested pain medication prior to placement of MARY device.   Device was assembled and patient was given IV morphine.  Immediately prior to placement of device additional clot was expressed from the uterus with active brisk bleeding noted.  At that time due to active heavy bleeding nursing was informed of need for additional assistance from anesthesia and  on floor.   Request was made for a second IV placement, TXA administration, and for blood to be obtained from the blood bank.   Upon arrival of anesthesia to the room BP noted to be 50/30.   Anesthesia began additional resuscitative measures while MARY device placed under US guidance.      Medical rapid response and MTP called.  MARY successfully placed.   Minimal blood noted in tubing immediately with placement.       Throughout the above patient continued to deny symptoms of lightheadedness, dizziness, CP/SOB.  she was mentating well and expressed understanding of all that was occurring.  she c/o feeling cold and blankets were placed on her for warmth.      CBC resulted with Hg 9.8 from 10.4.    Likely not fully equilibrated.   coags wnl.   currently has 1uPRBC being rapidly infused.  Will rapidly infuse a second unit when the first is complete.   Will check a CBC and lactate one hour after transfusions complete to determine if additional pRBC;s are needed.  Will administer prophylactic abx Ancef x 3 doses due to MARY device placement and continuos manipulation of the intrauterine cavity.   will continue to closely monitor.   Will initiate Methergine series.       Patient currently stable with BP's 110's/70's and HR in the 90's.  Continues to be asymptomatic.   all questions/concerns were addressed to her and her partners apparent satisfaction.      LILY Mcgregor MD

## 2023-09-21 NOTE — RAPID RESPONSE TEAM SUMMARY - NSADDTLFINDINGSRRT_GEN_ALL_CORE
Patient AOx4, heart sounds wnl, lungs clear. Patient was given TXA 1000mg and 500 mg epinephrine. 2 units of blood were ordered with a MTP and patient had 1 unit running and 1L IV fluid. A new IV line was placed by clinical impact nurse and labs (CBC, coags, lactate) were sent down to the lab. Patients blood pressure improved to 110/54, pulse 90-100s and satting well on room air.

## 2023-09-21 NOTE — OB PROVIDER TRIAGE NOTE - NSOBPROVIDERNOTE_OBGYN_ALL_OB_FT
Ms. FRANCY HOWARD is a 38y  @ 36w1d p/w lof since 2a last night, clear. 3cm dilated @ last visit on Tuesday. + FM. + ctx [id number]. Denies vb. GBS unk, taken on Tuesday. EFW 2800.  PNC: Dr. Dodd. Uncomplicated.     Plan  - Admit to Labor and Delivery for induction of labor for PROM @ 2a last night  - GBS unknown, for ampicillin   - Continuous EFM  - Clear diet, IV fluids  - Consent signed  - Standard labs (T&S, CBC, RPR)   - Induction/augmentation agent: Pitocin   - Consider re-examination in 4 hours     Informed consent was obtained. The following was discussed:  - Induction/augmentation of labor: use of medication and/or cook balloon to begin or enhance labor  - Obstetrical management including internal fetal/contraction monitoring  - Normal vaginal delivery  - Possible  section    Risks, benefits, alternatives, and possible complications have been discussed in detail with the patient in English, patient's preferred language, demonstrating understanding, all questions answered.. Pre-admission, admission, and post admission procedures and expectations were discussed in detail. All questions answered, all appropriate hospital consents were signed. Anticipate normal vaginal delivery.    Plan d/w Dr. De Souza. Dr. Solomon, PGY4 notified.

## 2023-09-21 NOTE — OB PROVIDER H&P - NS ATTEND AMEND GEN_ALL_CORE FT
Attending Note      at 36  + weeks presents with PROM   will admit for IOL with pitocin   GBS unknown     R Nolvia GUDINO

## 2023-09-21 NOTE — DISCHARGE NOTE OB - MEDICATION SUMMARY - MEDICATIONS TO TAKE
I will START or STAY ON the medications listed below when I get home from the hospital:    PNV Prenatal oral tablet  -- 1 tab(s) orally  -- Indication: For Supplement    levothyroxine 112 mcg (0.112 mg) oral tablet  -- 1 tab(s) orally  -- Indication: For HOme med   I will START or STAY ON the medications listed below when I get home from the hospital:    PNV Prenatal oral tablet  -- 1 tab(s) orally  -- Indication: For Supplement    ferrous sulfate 325 mg (65 mg elemental iron) oral tablet  -- 1 tab(s) by mouth 3 times a day  -- Indication: For anemia    levothyroxine 112 mcg (0.112 mg) oral tablet  -- 1 tab(s) orally  -- Indication: For HOme med

## 2023-09-21 NOTE — OB PROVIDER H&P - NSLOWPPHRISK_OBGYN_A_OB
No previous uterine incision/[place] Pregnancy/Less than or equal to 4 previous vaginal births/No known bleeding disorder/No history of postpartum hemorrhage/No other PPH risks indicated

## 2023-09-21 NOTE — DISCHARGE NOTE OB - HOSPITAL COURSE
IOL PROM.  Uncomplicated .  routine pp care.  IOL PROM.  NSD 9/21  PPH   qbl 1967  s/p MARY, 2u PRBCs   acute loss anemia- cont po iron

## 2023-09-21 NOTE — RAPID RESPONSE TEAM SUMMARY - NSSITUATIONBACKGROUNDRRT_GEN_ALL_CORE
38F with no PMHX presented to the hospital for vaginal delivery. She lost a total of 2L of blood during delivery, rapid was called for hypotension iso of post partum hemorrhage.

## 2023-09-21 NOTE — OB PROVIDER H&P - HISTORY OF PRESENT ILLNESS
Ms. FRANCY HOWARD is a 38y  @ 36w1d p/w lof since 2a last night, clear. 3cm dilated @ last visit on Tuesday. + FM. + ctx [id number]. Denies vb. GBS unk, taken on Tuesday. EFW 2800.  PNC: Dr. Dodd. Uncomplicated.  Denies prenatal issues or complications. Pt reports no hospitalizations, procedures, infections, or new diagnoses in pregnancy. Pt denies complications with blood pressure and/or blood sugar.

## 2023-09-21 NOTE — OB PROVIDER DELIVERY SUMMARY - NSPROVIDERDELIVERYNOTE_OBGYN_ALL_OB_FT
of viable infant.  Head, shoulders and body delivered easily.  Cord clamped and cut after delayed cord clamping was performed.   Placenta delivered intact.  Vaginal exam revealed intact cervix, vaginal walls and sulci. No laceration identified.  Excellent hemostasis.

## 2023-09-22 LAB
APTT BLD: 29.4 SEC — SIGNIFICANT CHANGE UP (ref 24.5–35.6)
BASOPHILS # BLD AUTO: 0.08 K/UL — SIGNIFICANT CHANGE UP (ref 0–0.2)
BASOPHILS NFR BLD AUTO: 0.5 % — SIGNIFICANT CHANGE UP (ref 0–2)
EOSINOPHIL # BLD AUTO: 0.2 K/UL — SIGNIFICANT CHANGE UP (ref 0–0.5)
EOSINOPHIL NFR BLD AUTO: 1.2 % — SIGNIFICANT CHANGE UP (ref 0–6)
FIBRINOGEN PPP-MCNC: 410 MG/DL — SIGNIFICANT CHANGE UP (ref 200–465)
FIBRINOGEN PPP-MCNC: 418 MG/DL — SIGNIFICANT CHANGE UP (ref 200–465)
HCT VFR BLD CALC: 28.1 % — LOW (ref 34.5–45)
HCT VFR BLD CALC: 29.4 % — LOW (ref 34.5–45)
HCT VFR BLD CALC: 31.1 % — LOW (ref 34.5–45)
HGB BLD-MCNC: 10.1 G/DL — LOW (ref 11.5–15.5)
HGB BLD-MCNC: 10.1 G/DL — LOW (ref 11.5–15.5)
HGB BLD-MCNC: 9.5 G/DL — LOW (ref 11.5–15.5)
IANC: 11.74 K/UL — HIGH (ref 1.8–7.4)
IMM GRANULOCYTES NFR BLD AUTO: 1.5 % — HIGH (ref 0–0.9)
INR BLD: 1.06 RATIO — SIGNIFICANT CHANGE UP (ref 0.85–1.18)
LACTATE SERPL-SCNC: 2 MMOL/L — SIGNIFICANT CHANGE UP (ref 0.5–2)
LYMPHOCYTES # BLD AUTO: 16.9 % — SIGNIFICANT CHANGE UP (ref 13–44)
LYMPHOCYTES # BLD AUTO: 2.85 K/UL — SIGNIFICANT CHANGE UP (ref 1–3.3)
MCHC RBC-ENTMCNC: 27.7 PG — SIGNIFICANT CHANGE UP (ref 27–34)
MCHC RBC-ENTMCNC: 28.1 PG — SIGNIFICANT CHANGE UP (ref 27–34)
MCHC RBC-ENTMCNC: 28.1 PG — SIGNIFICANT CHANGE UP (ref 27–34)
MCHC RBC-ENTMCNC: 32.5 GM/DL — SIGNIFICANT CHANGE UP (ref 32–36)
MCHC RBC-ENTMCNC: 33.8 GM/DL — SIGNIFICANT CHANGE UP (ref 32–36)
MCHC RBC-ENTMCNC: 34.4 GM/DL — SIGNIFICANT CHANGE UP (ref 32–36)
MCV RBC AUTO: 81.9 FL — SIGNIFICANT CHANGE UP (ref 80–100)
MCV RBC AUTO: 83.1 FL — SIGNIFICANT CHANGE UP (ref 80–100)
MCV RBC AUTO: 85.2 FL — SIGNIFICANT CHANGE UP (ref 80–100)
MONOCYTES # BLD AUTO: 1.79 K/UL — HIGH (ref 0–0.9)
MONOCYTES NFR BLD AUTO: 10.6 % — SIGNIFICANT CHANGE UP (ref 2–14)
NEUTROPHILS # BLD AUTO: 11.74 K/UL — HIGH (ref 1.8–7.4)
NEUTROPHILS NFR BLD AUTO: 69.3 % — SIGNIFICANT CHANGE UP (ref 43–77)
NRBC # BLD: 0 /100 WBCS — SIGNIFICANT CHANGE UP (ref 0–0)
NRBC # FLD: 0 K/UL — SIGNIFICANT CHANGE UP (ref 0–0)
PLATELET # BLD AUTO: 243 K/UL — SIGNIFICANT CHANGE UP (ref 150–400)
PLATELET # BLD AUTO: 246 K/UL — SIGNIFICANT CHANGE UP (ref 150–400)
PLATELET # BLD AUTO: 261 K/UL — SIGNIFICANT CHANGE UP (ref 150–400)
PROTHROM AB SERPL-ACNC: 11.9 SEC — SIGNIFICANT CHANGE UP (ref 9.5–13)
RBC # BLD: 3.38 M/UL — LOW (ref 3.8–5.2)
RBC # BLD: 3.59 M/UL — LOW (ref 3.8–5.2)
RBC # BLD: 3.65 M/UL — LOW (ref 3.8–5.2)
RBC # FLD: 15.6 % — HIGH (ref 10.3–14.5)
RBC # FLD: 15.8 % — HIGH (ref 10.3–14.5)
RBC # FLD: 15.9 % — HIGH (ref 10.3–14.5)
T PALLIDUM AB TITR SER: NEGATIVE — SIGNIFICANT CHANGE UP
WBC # BLD: 16.91 K/UL — HIGH (ref 3.8–10.5)
WBC # BLD: 17.07 K/UL — HIGH (ref 3.8–10.5)
WBC # BLD: 18.85 K/UL — HIGH (ref 3.8–10.5)
WBC # FLD AUTO: 16.91 K/UL — HIGH (ref 3.8–10.5)
WBC # FLD AUTO: 17.07 K/UL — HIGH (ref 3.8–10.5)
WBC # FLD AUTO: 18.85 K/UL — HIGH (ref 3.8–10.5)

## 2023-09-22 RX ORDER — LEVOTHYROXINE SODIUM 125 MCG
112 TABLET ORAL DAILY
Refills: 0 | Status: DISCONTINUED | OUTPATIENT
Start: 2023-09-22 | End: 2023-09-23

## 2023-09-22 RX ORDER — CEFAZOLIN SODIUM 1 G
1000 VIAL (EA) INJECTION ONCE
Refills: 0 | Status: COMPLETED | OUTPATIENT
Start: 2023-09-22 | End: 2023-09-22

## 2023-09-22 RX ORDER — TETANUS TOXOID, REDUCED DIPHTHERIA TOXOID AND ACELLULAR PERTUSSIS VACCINE, ADSORBED 5; 2.5; 8; 8; 2.5 [IU]/.5ML; [IU]/.5ML; UG/.5ML; UG/.5ML; UG/.5ML
0.5 SUSPENSION INTRAMUSCULAR ONCE
Refills: 0 | Status: COMPLETED | OUTPATIENT
Start: 2023-09-22 | End: 2023-09-22

## 2023-09-22 RX ORDER — SODIUM CHLORIDE 9 MG/ML
1000 INJECTION, SOLUTION INTRAVENOUS ONCE
Refills: 0 | Status: COMPLETED | OUTPATIENT
Start: 2023-09-22 | End: 2023-09-22

## 2023-09-22 RX ORDER — SODIUM CHLORIDE 9 MG/ML
1000 INJECTION, SOLUTION INTRAVENOUS ONCE
Refills: 0 | Status: DISCONTINUED | OUTPATIENT
Start: 2023-09-22 | End: 2023-09-22

## 2023-09-22 RX ORDER — IBUPROFEN 200 MG
600 TABLET ORAL EVERY 6 HOURS
Refills: 0 | Status: DISCONTINUED | OUTPATIENT
Start: 2023-09-22 | End: 2023-09-23

## 2023-09-22 RX ADMIN — Medication 600 MILLIGRAM(S): at 18:38

## 2023-09-22 RX ADMIN — Medication 0.2 MILLIGRAM(S): at 20:59

## 2023-09-22 RX ADMIN — Medication 600 MILLIGRAM(S): at 13:07

## 2023-09-22 RX ADMIN — Medication 100 MILLIGRAM(S): at 18:38

## 2023-09-22 RX ADMIN — Medication 0.2 MILLIGRAM(S): at 17:16

## 2023-09-22 RX ADMIN — Medication 0.2 MILLIGRAM(S): at 11:09

## 2023-09-22 RX ADMIN — Medication 600 MILLIGRAM(S): at 19:50

## 2023-09-22 RX ADMIN — Medication 0.2 MILLIGRAM(S): at 16:13

## 2023-09-22 RX ADMIN — SODIUM CHLORIDE 3 MILLILITER(S): 9 INJECTION INTRAMUSCULAR; INTRAVENOUS; SUBCUTANEOUS at 14:13

## 2023-09-22 RX ADMIN — Medication 975 MILLIGRAM(S): at 23:46

## 2023-09-22 RX ADMIN — SODIUM CHLORIDE 3 MILLILITER(S): 9 INJECTION INTRAMUSCULAR; INTRAVENOUS; SUBCUTANEOUS at 22:17

## 2023-09-22 RX ADMIN — Medication 0.2 MILLIGRAM(S): at 06:54

## 2023-09-22 RX ADMIN — Medication 975 MILLIGRAM(S): at 22:53

## 2023-09-22 RX ADMIN — SODIUM CHLORIDE 1000 MILLILITER(S): 9 INJECTION, SOLUTION INTRAVENOUS at 02:00

## 2023-09-22 RX ADMIN — Medication 0.2 MILLIGRAM(S): at 02:35

## 2023-09-22 RX ADMIN — Medication 1 TABLET(S): at 13:07

## 2023-09-22 RX ADMIN — Medication 975 MILLIGRAM(S): at 06:05

## 2023-09-22 RX ADMIN — TETANUS TOXOID, REDUCED DIPHTHERIA TOXOID AND ACELLULAR PERTUSSIS VACCINE, ADSORBED 0.5 MILLILITER(S): 5; 2.5; 8; 8; 2.5 SUSPENSION INTRAMUSCULAR at 22:55

## 2023-09-22 RX ADMIN — Medication 100 MILLIGRAM(S): at 02:35

## 2023-09-22 RX ADMIN — Medication 600 MILLIGRAM(S): at 16:00

## 2023-09-22 RX ADMIN — Medication 100 MILLIGRAM(S): at 17:00

## 2023-09-22 NOTE — OB POSTPARTUM EVENT NOTE - NS_EVENTSUMMARY1_OBGYN_ALL_OB_FT
s/p uncomplicated  at 1800 on . qbl 285ml at delivery and no lacerations. Upon nursing assessment at 191 and  bleeding noted to be moderate lochia rubra, fundus firm, midline and at umbilicus. Pad weighed and changed at , additional 130ml.  fundal check fundus firm midline and at umbilicus with heavy bright red vaginal bleeding noted. MD Frank made aware at this time RN requesting MD assessment of bleeding. Pad weighed, additional 265ml noted with small clot on pad. Plan of care discussed with MD Whipple and MD Frank around , no uterotonics to be given at this time, no US or manual VE performed by MD and RN to reassess bleeding per protocol. Pt called RN to bedside at  stating, "A lot just came out" Upon RN assessment, excessive amount of clots and lochia rubra noted on new peripad and MD's called back to bedside. MD Whipple, MD Yadav and MD Frank at bedside for assessment at   s/p uncomplicated  at 1800 on . qbl 285ml at delivery and no lacerations. Upon nursing assessment at 1920 and 193 bleeding noted to be moderate lochia rubra, fundus firm, midline and at umbilicus. Pad weighed and changed at 193, additional 130ml. 1950 fundal check fundus firm midline and at umbilicus with heavy bright red vaginal bleeding noted. MD Frank made aware at this time RN requesting MD assessment of bleeding. Pad weighed, additional 265ml noted with small clot on pad. Plan of care discussed with MD Whipple and MD Frank around , no uterotonics to be given at this time, no US or manual VE performed by MD and RN to reassess bleeding per protocol. Pt called RN to bedside at  stating, "A lot just came out" Upon RN assessment, excessive amount of clots and lochia rubra noted on new peripad and MD's called back to bedside. MD Whipple, MD Yadav and MD Frank at bedside for assessment at

## 2023-09-22 NOTE — PROGRESS NOTE ADULT - ATTENDING COMMENTS
Attending Note     PPD 1 s/p  c/w PPH s/p Nona and symptomatic anemia from acute blood loss s/p 2 U PRBCS  reports feeling well   minimal bleeding   abd soft, fundus firm nontender  ext no c/c/e   hgb 10  routine pp care  repeat cbc in AM     R Nolvia GUDINO

## 2023-09-22 NOTE — PROVIDER CONTACT NOTE (OTHER) - ASSESSMENT
lying bp 113/68   sitting bp 91/55   standing bp 107/58   Pt felt lightheaded upon standing  Pt voided 450 on bedpan and is tolerating PO hydration

## 2023-09-22 NOTE — OB POSTPARTUM EVENT NOTE - NS_EVENTPTSUMMARY1_OBGYN_ALL_OB_FT
Methergine 0.2mg IM given at 2022  Rectal Cytotec VT given 2027  R Hand 16g second IV in place by anesthesia  1L LR bolus initiated 2047  Nona in place by MD Yadav at 2047  Morphine 2mg IV push given for pain management during NONA placement  TXA 1g given IV at 2049  CBC and coags sent 2045    Vital signs:  2020 -   /73    RR 18  SpO2 100%    2048 -   BP 80/50  HR 92  RR 20  SpO2 97%    2058 -   /53  HR 99  RR 22  SpO2 100%

## 2023-09-22 NOTE — OB POSTPARTUM EVENT NOTE - NS_EVENTFINDINGS1_OBGYN_ALL_OB_FT
Per MD Whipple methergine and cytotec to be given, CBC and coags to be drawn, second peripheral IV line to be initiated, TXA to be given, IV bolus to be initiated, anesthesia called to bedside per MD, Pt for 2 units PRBCs per MD Isidro, serum lactate to be sent, Nona in place on suction at this time. Total QBL-1967ml. Pt to remain on L&D at this time

## 2023-09-23 VITALS
RESPIRATION RATE: 17 BRPM | DIASTOLIC BLOOD PRESSURE: 59 MMHG | OXYGEN SATURATION: 97 % | HEART RATE: 92 BPM | TEMPERATURE: 98 F | SYSTOLIC BLOOD PRESSURE: 97 MMHG

## 2023-09-23 LAB
BASOPHILS # BLD AUTO: 0.05 K/UL — SIGNIFICANT CHANGE UP (ref 0–0.2)
BASOPHILS NFR BLD AUTO: 0.4 % — SIGNIFICANT CHANGE UP (ref 0–2)
EOSINOPHIL # BLD AUTO: 0.37 K/UL — SIGNIFICANT CHANGE UP (ref 0–0.5)
EOSINOPHIL NFR BLD AUTO: 2.9 % — SIGNIFICANT CHANGE UP (ref 0–6)
HCT VFR BLD CALC: 25.2 % — LOW (ref 34.5–45)
HGB BLD-MCNC: 8.3 G/DL — LOW (ref 11.5–15.5)
IANC: 7.91 K/UL — HIGH (ref 1.8–7.4)
IMM GRANULOCYTES NFR BLD AUTO: 1.7 % — HIGH (ref 0–0.9)
LACTATE SERPL-SCNC: 1.6 MMOL/L — SIGNIFICANT CHANGE UP (ref 0.5–2)
LYMPHOCYTES # BLD AUTO: 2.94 K/UL — SIGNIFICANT CHANGE UP (ref 1–3.3)
LYMPHOCYTES # BLD AUTO: 23.2 % — SIGNIFICANT CHANGE UP (ref 13–44)
MCHC RBC-ENTMCNC: 27.8 PG — SIGNIFICANT CHANGE UP (ref 27–34)
MCHC RBC-ENTMCNC: 32.9 GM/DL — SIGNIFICANT CHANGE UP (ref 32–36)
MCV RBC AUTO: 84.3 FL — SIGNIFICANT CHANGE UP (ref 80–100)
MONOCYTES # BLD AUTO: 1.21 K/UL — HIGH (ref 0–0.9)
MONOCYTES NFR BLD AUTO: 9.5 % — SIGNIFICANT CHANGE UP (ref 2–14)
NEUTROPHILS # BLD AUTO: 7.91 K/UL — HIGH (ref 1.8–7.4)
NEUTROPHILS NFR BLD AUTO: 62.3 % — SIGNIFICANT CHANGE UP (ref 43–77)
NRBC # BLD: 0 /100 WBCS — SIGNIFICANT CHANGE UP (ref 0–0)
NRBC # FLD: 0 K/UL — SIGNIFICANT CHANGE UP (ref 0–0)
PLATELET # BLD AUTO: 232 K/UL — SIGNIFICANT CHANGE UP (ref 150–400)
RBC # BLD: 2.99 M/UL — LOW (ref 3.8–5.2)
RBC # FLD: 16.1 % — HIGH (ref 10.3–14.5)
WBC # BLD: 12.69 K/UL — HIGH (ref 3.8–10.5)
WBC # FLD AUTO: 12.69 K/UL — HIGH (ref 3.8–10.5)

## 2023-09-23 RX ORDER — ASCORBIC ACID 60 MG
500 TABLET,CHEWABLE ORAL DAILY
Refills: 0 | Status: DISCONTINUED | OUTPATIENT
Start: 2023-09-23 | End: 2023-09-23

## 2023-09-23 RX ORDER — FERROUS SULFATE 325(65) MG
1 TABLET ORAL
Qty: 0 | Refills: 0 | DISCHARGE
Start: 2023-09-23

## 2023-09-23 RX ORDER — FERROUS SULFATE 325(65) MG
325 TABLET ORAL THREE TIMES A DAY
Refills: 0 | Status: DISCONTINUED | OUTPATIENT
Start: 2023-09-23 | End: 2023-09-23

## 2023-09-23 RX ORDER — SENNA PLUS 8.6 MG/1
2 TABLET ORAL AT BEDTIME
Refills: 0 | Status: DISCONTINUED | OUTPATIENT
Start: 2023-09-23 | End: 2023-09-23

## 2023-09-23 RX ADMIN — Medication 975 MILLIGRAM(S): at 15:00

## 2023-09-23 RX ADMIN — Medication 600 MILLIGRAM(S): at 07:35

## 2023-09-23 RX ADMIN — Medication 975 MILLIGRAM(S): at 15:30

## 2023-09-23 RX ADMIN — Medication 600 MILLIGRAM(S): at 12:16

## 2023-09-23 RX ADMIN — Medication 975 MILLIGRAM(S): at 09:10

## 2023-09-23 RX ADMIN — SODIUM CHLORIDE 3 MILLILITER(S): 9 INJECTION INTRAMUSCULAR; INTRAVENOUS; SUBCUTANEOUS at 06:12

## 2023-09-23 RX ADMIN — Medication 600 MILLIGRAM(S): at 12:46

## 2023-09-23 RX ADMIN — Medication 600 MILLIGRAM(S): at 06:54

## 2023-09-23 RX ADMIN — Medication 112 MICROGRAM(S): at 06:38

## 2023-09-23 RX ADMIN — Medication 1 TABLET(S): at 12:15

## 2023-09-23 RX ADMIN — Medication 975 MILLIGRAM(S): at 09:40

## 2023-09-23 NOTE — PROGRESS NOTE ADULT - SUBJECTIVE AND OBJECTIVE BOX
Called earlier approx 2025 for patient with PPH, as per chief resident MARY placed and assistance not required.    Called back again at approximately  2035 by Chief resident for on going PPH EBL approximately 1500.  2 Anesthesia attending arrived, patient with 18 GA PIV running at 999 ml/hr.  Patient did not have BP x 20 minutes.  Repeat BP 50/30s.  Phenylephrine 100 given, TXA administered 1000 mg, additional 100 mcg phenylephrine given, 2 PRBCs sent for.  16 GA PIV placed left hand by Anesthesiologist.  second IV fluid bolus given.  Additional phenylephrine 100 mcg given.  BP 80/50s .  D/W team re: need for rapid response if criteria met.  additional phenylephrine to a total of 500 mcg given.  ICU teams arrived following call for rapid response as well as MTP.  After treatment from our team SBP's >100 and HR <100 and patient mentating but cold.  ICU team at bedside at present.   Anesthesia team assisted with setup of fluid warmer.  OB team and ICU teams continue at bedside.  Anesthesia team available at any time to assist.
OB Progress Note:  PPD#2    S: 37yo PPD#1 s/p  c/b PPH with QBL 1967. MTP/RRT called on PPD#0. Patient feels well today, no weakness or dizziness. Pain is well controlled. She is tolerating a regular diet and passing flatus. She is voiding spontaneously, and ambulating without difficulty. Denies CP/SOB. Denies lightheadedness/dizziness. Denies N/V.        O:  Vitals:   Vital Signs Last 24 Hrs  T(C): 36.4 (23 Sep 2023 10:18), Max: 36.8 (23 Sep 2023 02:15)  T(F): 97.5 (23 Sep 2023 10:18), Max: 98.2 (23 Sep 2023 02:15)  HR: 87 (23 Sep 2023 10:18) (87 - 96)  BP: 107/63 (23 Sep 2023 10:18) (107/63 - 111/62)  BP(mean): --  RR: 17 (23 Sep 2023 10:18) (17 - 18)  SpO2: 100% (23 Sep 2023 10:18) (99% - 100%)    Parameters below as of 23 Sep 2023 10:18  Patient On (Oxygen Delivery Method): room air        MEDICATIONS  (STANDING):  acetaminophen     Tablet .. 975 milliGRAM(s) Oral <User Schedule>  ibuprofen  Tablet. 600 milliGRAM(s) Oral every 6 hours  influenza   Vaccine 0.5 milliLiter(s) IntraMuscular once  levothyroxine 112 MICROGram(s) Oral daily  prenatal multivitamin 1 Tablet(s) Oral daily  sodium chloride 0.9% lock flush 3 milliLiter(s) IV Push every 8 hours    MEDICATIONS  (PRN):  benzocaine 20%/menthol 0.5% Spray 1 Spray(s) Topical every 6 hours PRN for Perineal discomfort  dibucaine 1% Ointment 1 Application(s) Topical every 6 hours PRN Perineal discomfort  diphenhydrAMINE 25 milliGRAM(s) Oral every 6 hours PRN Pruritus  hydrocortisone 1% Cream 1 Application(s) Topical every 6 hours PRN Moderate Pain (4-6)  lanolin Ointment 1 Application(s) Topical every 6 hours PRN nipple soreness  magnesium hydroxide Suspension 30 milliLiter(s) Oral two times a day PRN Constipation  oxyCODONE    IR 5 milliGRAM(s) Oral every 3 hours PRN Moderate to Severe Pain (4-10)  oxyCODONE    IR 5 milliGRAM(s) Oral once PRN Moderate to Severe Pain (4-10)  pramoxine 1%/zinc 5% Cream 1 Application(s) Topical every 4 hours PRN Moderate Pain (4-6)  simethicone 80 milliGRAM(s) Chew every 4 hours PRN Gas  witch hazel Pads 1 Application(s) Topical every 4 hours PRN Perineal discomfort      Labs:  Blood type: O Positive  Rubella IgG: RPR: Negative                          8.3<L>   12.69<H> >-----------< 232    (  @ 07:30 )             25.2<L>                        9.5<L>   16.91<H> >-----------< 246    (  @ 21:00 )             28.1<L>                        10.1<L>   18.85<H> >-----------< 243    (  @ 03:52 )             29.4<L>                        10.1<L>   17.07<H> >-----------< 261    (  @ 00:15 )             31.1<L>                        9.8<L>   18.29<H> >-----------< 305    (  @ 20:40 )             29.8<L>                        10.4<L>   10.71<H> >-----------< 329    (  @ 12:20 )             32.1<L>                  Physical Exam:  General: NAD  Abdomen: soft, non-tender, non-distended, fundus firm  Vaginal: Lochia wnl  Extremities: No erythema/edema  
OB Progress Note:  PPD#1    S: 39yo  PPD#1 s/p . Patient feels well. Pain is well controlled, tolerating regular diet, voiding spontaneously, ambulating without dizziness or lightheadedness. Denies heavy vaginal bleeding, CP/SOB, N/V, lightheadedness/dizziness.     O:  Vitals:  Vital Signs Last 24 Hrs  T(C): 36.7 (22 Sep 2023 06:57), Max: 36.9 (21 Sep 2023 22:14)  T(F): 98.1 (22 Sep 2023 06:57), Max: 98.4 (21 Sep 2023 22:14)  HR: 70 (22 Sep 2023 06:57) (70 - 145)  BP: 96/59 (22 Sep 2023 06:57) (56/30 - 126/56)  BP(mean): --  RR: 20 (22 Sep 2023 06:57) (16 - 20)  SpO2: 98% (22 Sep 2023 06:57) (91% - 100%)        MEDICATIONS  (STANDING):  acetaminophen     Tablet .. 975 milliGRAM(s) Oral <User Schedule>  ceFAZolin   IVPB      ceFAZolin   IVPB 1000 milliGRAM(s) IV Intermittent every 8 hours  diphtheria/tetanus/pertussis (acellular) Vaccine (Adacel) 0.5 milliLiter(s) IntraMuscular once  ibuprofen  Tablet. 600 milliGRAM(s) Oral every 6 hours  influenza   Vaccine 0.5 milliLiter(s) IntraMuscular once  methylergonovine 0.2 milliGRAM(s) Oral every 4 hours  oxytocin Infusion 333.333 milliUNIT(s)/Min (1000 mL/Hr) IV Continuous <Continuous>  oxytocin Infusion 41.667 milliUNIT(s)/Min (125 mL/Hr) IV Continuous <Continuous>  prenatal multivitamin 1 Tablet(s) Oral daily  sodium chloride 0.9% lock flush 3 milliLiter(s) IV Push every 8 hours      Labs:  Blood type: O Positive  Rubella IgG: RPR: Negative                          10.1<L>   18.85<H> >-----------< 243    (  @ 03:52 )             29.4<L>                        10.1<L>   17.07<H> >-----------< 261    (  @ 00:15 )             31.1<L>                        9.8<L>   18.29<H> >-----------< 305    (  @ 20:40 )             29.8<L>                        10.4<L>   10.71<H> >-----------< 329    (  @ 12:20 )             32.1<L>          Physical Exam:  General: NAD  Abdomen: soft, non-tender, non-distended, fundus firm  Vaginal: No heavy vaginal bleeding  Extremities: No erythema/edema

## 2024-01-10 NOTE — ED PROVIDER NOTE - NEURO NEGATIVE STATEMENT, MLM
no loss of consciousness, no gait abnormality, no headache, no sensory deficits, and no weakness. +left lower back pain Repeat

## 2024-05-01 ENCOUNTER — INPATIENT (INPATIENT)
Facility: HOSPITAL | Age: 39
LOS: 0 days | Discharge: HOME | End: 2024-05-02
Attending: INTERNAL MEDICINE | Admitting: INTERNAL MEDICINE
Payer: COMMERCIAL

## 2024-05-01 VITALS
RESPIRATION RATE: 19 BRPM | OXYGEN SATURATION: 98 % | SYSTOLIC BLOOD PRESSURE: 117 MMHG | HEART RATE: 74 BPM | DIASTOLIC BLOOD PRESSURE: 56 MMHG

## 2024-05-01 LAB
HCG UR QL: NEGATIVE — SIGNIFICANT CHANGE UP
ISTAT INR: 1 — SIGNIFICANT CHANGE UP (ref 0.88–1.16)
ISTAT PT: 12.4 SEC — SIGNIFICANT CHANGE UP (ref 10–12.9)
ISTAT VENOUS BE: 0 MMOL/L — SIGNIFICANT CHANGE UP (ref -2–3)
ISTAT VENOUS GLUCOSE: 86 MG/DL — SIGNIFICANT CHANGE UP (ref 70–99)
ISTAT VENOUS HCO3: 28 MMOL/L — SIGNIFICANT CHANGE UP (ref 23–28)
ISTAT VENOUS HEMATOCRIT: 42 % — SIGNIFICANT CHANGE UP (ref 34.5–45)
ISTAT VENOUS HEMOGLOBIN: 14.3 GM/DL — SIGNIFICANT CHANGE UP (ref 11.5–15.5)
ISTAT VENOUS IONIZED CALCIUM: 1.27 MMOL/L — SIGNIFICANT CHANGE UP (ref 1.12–1.3)
ISTAT VENOUS PCO2: 56 MMHG — HIGH (ref 41–51)
ISTAT VENOUS PH: 7.31 — SIGNIFICANT CHANGE UP (ref 7.31–7.41)
ISTAT VENOUS PO2: <66 MMHG — LOW (ref 35–40)
ISTAT VENOUS POTASSIUM: 4 MMOL/L — SIGNIFICANT CHANGE UP (ref 3.5–5.3)
ISTAT VENOUS SO2: 23 % — SIGNIFICANT CHANGE UP
ISTAT VENOUS SODIUM: 142 MMOL/L — SIGNIFICANT CHANGE UP (ref 135–145)
ISTAT VENOUS TCO2: 30 MMOL/L — SIGNIFICANT CHANGE UP (ref 22–31)
POCT ISTAT CREATININE: 0.7 MG/DL — SIGNIFICANT CHANGE UP (ref 0.5–1.3)

## 2024-05-01 PROCEDURE — 93623 PRGRMD STIMJ&PACG IV RX NFS: CPT | Mod: 26

## 2024-05-01 PROCEDURE — 93010 ELECTROCARDIOGRAM REPORT: CPT

## 2024-05-01 PROCEDURE — 93653 COMPRE EP EVAL TX SVT: CPT

## 2024-05-01 RX ORDER — SODIUM CHLORIDE 9 MG/ML
1000 INJECTION, SOLUTION INTRAVENOUS
Refills: 0 | Status: DISCONTINUED | OUTPATIENT
Start: 2024-05-01 | End: 2024-05-02

## 2024-05-01 RX ORDER — DEXTROSE 10 % IN WATER 10 %
125 INTRAVENOUS SOLUTION INTRAVENOUS ONCE
Refills: 0 | Status: DISCONTINUED | OUTPATIENT
Start: 2024-05-01 | End: 2024-05-02

## 2024-05-01 RX ORDER — DEXTROSE 50 % IN WATER 50 %
25 SYRINGE (ML) INTRAVENOUS ONCE
Refills: 0 | Status: DISCONTINUED | OUTPATIENT
Start: 2024-05-01 | End: 2024-05-02

## 2024-05-01 RX ORDER — GLUCAGON INJECTION, SOLUTION 0.5 MG/.1ML
1 INJECTION, SOLUTION SUBCUTANEOUS ONCE
Refills: 0 | Status: DISCONTINUED | OUTPATIENT
Start: 2024-05-01 | End: 2024-05-02

## 2024-05-01 RX ORDER — METFORMIN HYDROCHLORIDE 850 MG/1
1 TABLET ORAL
Refills: 0 | DISCHARGE

## 2024-05-01 RX ORDER — DEXTROSE 50 % IN WATER 50 %
12.5 SYRINGE (ML) INTRAVENOUS ONCE
Refills: 0 | Status: DISCONTINUED | OUTPATIENT
Start: 2024-05-01 | End: 2024-05-02

## 2024-05-01 RX ORDER — ASPIRIN/CALCIUM CARB/MAGNESIUM 324 MG
1 TABLET ORAL
Refills: 0 | DISCHARGE

## 2024-05-01 RX ORDER — LEVOTHYROXINE SODIUM 125 MCG
1 TABLET ORAL
Refills: 0 | DISCHARGE

## 2024-05-01 RX ORDER — DEXTROSE 50 % IN WATER 50 %
15 SYRINGE (ML) INTRAVENOUS ONCE
Refills: 0 | Status: DISCONTINUED | OUTPATIENT
Start: 2024-05-01 | End: 2024-05-02

## 2024-05-01 RX ORDER — ASPIRIN/CALCIUM CARB/MAGNESIUM 324 MG
81 TABLET ORAL DAILY
Refills: 0 | Status: DISCONTINUED | OUTPATIENT
Start: 2024-05-02 | End: 2024-05-02

## 2024-05-01 RX ORDER — LEVOTHYROXINE SODIUM 125 MCG
125 TABLET ORAL DAILY
Refills: 0 | Status: DISCONTINUED | OUTPATIENT
Start: 2024-05-02 | End: 2024-05-02

## 2024-05-01 NOTE — H&P ADULT - HISTORY OF PRESENT ILLNESS
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 here today for EP study/ ablation    PAST MEDICAL & SURGICAL HISTORY:  Hypothyroidism    Social History: no smoking, no drugs, no etoh     pertinent home medications:  see below     Allergies: No Known Allergies      ROS:   CONSTITUTIONAL: No fever, weight loss + fatigue  EYES: Pt denies  RESPIRATORY: No cough, wheezing, chills or hemoptysis; No Shortness of Breath  CARDIOVASCULAR: see HPI  GASTROINTESTINAL: Pt denies  NEUROLOGICAL: Pt denies  SKIN: Pt denies   PSYCHIATRIC: Pt denies  HEME/LYMPH: Pt denies    PHYSICAL:  VSS  Appearance: No acute distress, well developed  Eyes: normal appearing conjunctiva, pupils and eyelids  Cardiovascular: Normal S1 S2, No JVD, No murmurs, No edema  Respiratory: Lungs clear to auscultation	bilaterally.  No wheeze, rhonchi, rales noted  Gastrointestinal:  Soft, NT/ND 	  Neurologic:  No deficit noted  Psych: A&Ox3, normal mood/affect  Musculoskeletal: normal gait  Skin: no rash noted, normal color and pigmentation.        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

## 2024-05-02 ENCOUNTER — TRANSCRIPTION ENCOUNTER (OUTPATIENT)
Age: 39
End: 2024-05-02

## 2024-05-02 VITALS — SYSTOLIC BLOOD PRESSURE: 114 MMHG | TEMPERATURE: 98 F | DIASTOLIC BLOOD PRESSURE: 56 MMHG

## 2024-05-02 LAB
A1C WITH ESTIMATED AVERAGE GLUCOSE RESULT: 5.2 % — SIGNIFICANT CHANGE UP (ref 4–5.6)
ESTIMATED AVERAGE GLUCOSE: 103 MG/DL — SIGNIFICANT CHANGE UP (ref 68–114)

## 2024-05-02 PROCEDURE — 82947 ASSAY GLUCOSE BLOOD QUANT: CPT

## 2024-05-02 PROCEDURE — 82565 ASSAY OF CREATININE: CPT

## 2024-05-02 PROCEDURE — 81025 URINE PREGNANCY TEST: CPT

## 2024-05-02 PROCEDURE — 84132 ASSAY OF SERUM POTASSIUM: CPT

## 2024-05-02 PROCEDURE — 93005 ELECTROCARDIOGRAM TRACING: CPT

## 2024-05-02 PROCEDURE — 82803 BLOOD GASES ANY COMBINATION: CPT

## 2024-05-02 PROCEDURE — C1894: CPT

## 2024-05-02 PROCEDURE — 36415 COLL VENOUS BLD VENIPUNCTURE: CPT

## 2024-05-02 PROCEDURE — 84295 ASSAY OF SERUM SODIUM: CPT

## 2024-05-02 PROCEDURE — 82330 ASSAY OF CALCIUM: CPT

## 2024-05-02 PROCEDURE — C1766: CPT

## 2024-05-02 PROCEDURE — 85610 PROTHROMBIN TIME: CPT

## 2024-05-02 PROCEDURE — C1730: CPT

## 2024-05-02 PROCEDURE — 83036 HEMOGLOBIN GLYCOSYLATED A1C: CPT

## 2024-05-02 PROCEDURE — 85014 HEMATOCRIT: CPT

## 2024-05-02 PROCEDURE — C2630: CPT

## 2024-05-02 RX ORDER — METOPROLOL TARTRATE 50 MG
1 TABLET ORAL
Refills: 0 | DISCHARGE

## 2024-05-02 RX ORDER — PANTOPRAZOLE SODIUM 20 MG/1
40 TABLET, DELAYED RELEASE ORAL ONCE
Refills: 0 | Status: COMPLETED | OUTPATIENT
Start: 2024-05-02 | End: 2024-05-02

## 2024-05-02 RX ADMIN — Medication 81 MILLIGRAM(S): at 11:16

## 2024-05-02 RX ADMIN — Medication 125 MICROGRAM(S): at 06:11

## 2024-05-02 RX ADMIN — PANTOPRAZOLE SODIUM 40 MILLIGRAM(S): 20 TABLET, DELAYED RELEASE ORAL at 04:22

## 2024-05-02 NOTE — PATIENT PROFILE ADULT - FALL HARM RISK - HARM RISK INTERVENTIONS

## 2024-05-02 NOTE — DISCHARGE NOTE NURSING/CASE MANAGEMENT/SOCIAL WORK - NSDCVIVACCINE_GEN_ALL_CORE_FT
Tdap; 22-Sep-2023 22:55; Tracy Stevenson (RN); [employer]; [id number] (Exp. Date: 12-May-2025); IntraMuscular; Deltoid Left.; 0.5 milliLiter(s); VIS (VIS Published: 09-May-2013, VIS Presented: 22-Sep-2023);

## 2024-05-02 NOTE — DISCHARGE NOTE NURSING/CASE MANAGEMENT/SOCIAL WORK - PATIENT PORTAL LINK FT
You can access the FollowMyHealth Patient Portal offered by [facility] by registering at the following website: [email or web address]. By joining [facility]’s FollowMyHealth portal, you will also be able to view your health information using other applications (apps) compatible with our system.

## 2024-05-02 NOTE — DISCHARGE NOTE PROVIDER - CARE PROVIDER_API CALL
Philip Aguilera  [address] [place] [address]  Phone: [phone]  Fax: (   )    -  Follow Up Time: 2 weeks

## 2024-05-02 NOTE — DISCHARGE NOTE PROVIDER - NSDCMRMEDTOKEN_GEN_ALL_CORE_FT
aspirin 81 mg oral tablet: 1 tab(s) orally once a day  levothyroxine 125 mcg (0.125 mg) oral tablet: 1 tab(s) orally once a day  metFORMIN 500 mg oral tablet: 1 tab(s) orally once a day

## 2024-05-02 NOTE — DISCHARGE NOTE NURSING/CASE MANAGEMENT/SOCIAL WORK - NSDCPEFALRISK_GEN_ALL_CORE
done For information on Fall & Injury Prevention, visit: [email or web address] OR  [email or web address] OR  https://www.cdc.gov/steadi/patient.html

## 2024-05-02 NOTE — DISCHARGE NOTE PROVIDER - HOSPITAL COURSE
Subjective:    pt is s/p successful AVNRT ablation  vitals stable overnight  telemetry shows sr with occasional APCs  patient feeling well no complaints.     denies chest pain, palpitations, dizziness, syncope, nausea, vomiting, diarrhea, groin pain, facial droop or extremity weakness.     Inpatient Medications:   aspirin  chewable 81 milliGRAM(s) Oral daily  dextrose 10% Bolus 125 milliLiter(s) IV Bolus once  dextrose 5%. 1000 milliLiter(s) IV Continuous <Continuous>  dextrose 5%. 1000 milliLiter(s) IV Continuous <Continuous>  dextrose 50% Injectable 25 Gram(s) IV Push once  dextrose 50% Injectable 12.5 Gram(s) IV Push once  dextrose Oral Gel 15 Gram(s) Oral once PRN  glucagon  Injectable 1 milliGRAM(s) IntraMuscular once  levothyroxine 125 MICROGram(s) Oral daily      Allergies: No Known Allergies      ROS:   CONSTITUTIONAL: No fever, weight loss + fatigue  EYES: Pt denies  RESPIRATORY: No cough, wheezing, chills or hemoptysis; No Shortness of Breath  CARDIOVASCULAR: see HPI  GASTROINTESTINAL: Pt denies  NEUROLOGICAL: Pt denies  SKIN: Pt denies   PSYCHIATRIC: Pt denies  HEME/LYMPH: Pt denies    PHYSICAL:  T(C): 36.4 (05-02-24 @ 06:08), Max: 36.7 (05-01-24 @ 20:54)  HR: 72 (05-02-24 @ 06:08) (72 - 80)  BP: 118/56 (05-02-24 @ 06:08) (108/58 - 120/66)  RR: 20 (05-02-24 @ 06:08) (14 - 20)  SpO2: 97% (05-02-24 @ 06:08) (97% - 99%)  Appearance: No acute distress, well developed  Eyes: normal appearing conjunctiva, pupils and eyelids  Cardiovascular: Normal S1 S2, No JVD, No murmurs, No edema  Respiratory: Lungs clear to auscultation	bilaterally.  No wheeze, rhonchi, rales noted  Gastrointestinal:  Soft, NT/ND 	  Neurologic:  No deficit noted  Psych: A&Ox3, normal mood/affect  Extremities: Groin exam: r groin soft nontender no bleeding or hematoma.            Assessment Plan:  pt is s/p successful AVNRT ablation    -stop metoprolol  -cont all other home rx  -post procedure instructions provided to the patient  -ok for dc home today.

## 2024-05-02 NOTE — DISCHARGE NOTE PROVIDER - NSDCCPCAREPLAN_GEN_ALL_CORE_FT
PRINCIPAL DISCHARGE DIAGNOSIS  Diagnosis: AVNRT (AV hermelinda re-entry tachycardia)  Assessment and Plan of Treatment:

## 2024-05-02 NOTE — DISCHARGE NOTE PROVIDER - PROVIDER TOKENS
FREE:[LAST:[Willy],FIRST:[Philip],PHONE:[phone]],FAX:[(   )    -],ADDRESS:[[address] [place] [address]],FOLLOWUP:[2 weeks]]

## 2024-05-09 DIAGNOSIS — E66.9 OBESITY, UNSPECIFIED: ICD-10-CM

## 2024-05-09 DIAGNOSIS — I47.10 SUPRAVENTRICULAR TACHYCARDIA, UNSPECIFIED: ICD-10-CM

## 2024-05-09 DIAGNOSIS — Z79.82 LONG TERM (CURRENT) USE OF ASPIRIN: ICD-10-CM

## 2024-05-09 DIAGNOSIS — Z79.84 LONG TERM (CURRENT) USE OF ORAL HYPOGLYCEMIC DRUGS: ICD-10-CM

## 2024-05-09 DIAGNOSIS — R73.03 PREDIABETES: ICD-10-CM

## 2024-05-09 DIAGNOSIS — Z79.890 HORMONE REPLACEMENT THERAPY: ICD-10-CM

## 2024-05-09 DIAGNOSIS — E03.9 HYPOTHYROIDISM, UNSPECIFIED: ICD-10-CM

## 2024-08-08 NOTE — OB RN TRIAGE NOTE - TEMPERATURE IN CELSIUS (DEGREES C)
Pt requesting to discuss lab results from 7/25/24 that were ordered by you and completed at [facility]. They are uploaded in her chart. Please advise.   
36.7

## 2024-10-30 NOTE — OB PROVIDER TRIAGE NOTE - NS_RISKASSESSMENT_OBGYN_ALL_OB
Pt states she is not feeling suicidal or having any thoughts of hurting herself.  Repeat troponin drawn and sent to lab.  Sitter at bedside.  Shanta neely   No

## 2025-03-19 NOTE — OB RN PATIENT PROFILE - WEIGHT IN LBS
Follow-up with your Pediatrician in 1-2 days.  Make sure your child stays hydrated. Come back to the pediatrician or come to the ED if your child is drinking less, urinating less, has difficulty breathing or any other concerning signs or symptoms.    Evaluation didn't show any obvious head or neck injury.  Observation showed no signs of bleeding in your head.  This leaves concussion as the most likely cause of your symptoms.  A concussion is similar to a "brain bruise."  Physical and mental rest is the best treatment.  No more sports until cleared by a physician.    Until you feel yourself, you should not go to school, participate in physical activity, ride bike/skates, or drive.    You may have some residual headaches, which can be treated with Tylenol.  But, if  you have a severe headache, start having trouble seeing, are unable to walk, have weakness in one of your arms or legs, have a seizure, or have vomitting that is frequent, you should immediately return to the ED for re-evaluation. 197.9